# Patient Record
Sex: MALE | Race: WHITE | URBAN - METROPOLITAN AREA
[De-identification: names, ages, dates, MRNs, and addresses within clinical notes are randomized per-mention and may not be internally consistent; named-entity substitution may affect disease eponyms.]

---

## 2020-12-03 ENCOUNTER — OFFICE VISIT (OUTPATIENT)
Dept: PRIMARY CARE CLINIC | Age: 25
End: 2020-12-03
Payer: MEDICAID

## 2020-12-03 PROCEDURE — G8428 CUR MEDS NOT DOCUMENT: HCPCS | Performed by: NURSE PRACTITIONER

## 2020-12-03 PROCEDURE — G8421 BMI NOT CALCULATED: HCPCS | Performed by: NURSE PRACTITIONER

## 2020-12-03 PROCEDURE — 99211 OFF/OP EST MAY X REQ PHY/QHP: CPT | Performed by: NURSE PRACTITIONER

## 2020-12-03 NOTE — PROGRESS NOTES
Ron Tian received a viral test for COVID-19. They were educated on isolation and quarantine as appropriate. For any symptoms, they were directed to seek care from their PCP, given contact information to establish with a doctor, directed to an urgent care or the emergency room.

## 2020-12-07 LAB — SARS-COV-2, NAA: NOT DETECTED

## 2020-12-09 ENCOUNTER — VIRTUAL VISIT (OUTPATIENT)
Dept: PRIMARY CARE CLINIC | Age: 25
End: 2020-12-09
Payer: MEDICAID

## 2020-12-09 PROBLEM — F33.42 RECURRENT MAJOR DEPRESSIVE DISORDER, IN FULL REMISSION (HCC): Status: ACTIVE | Noted: 2017-08-30

## 2020-12-09 PROCEDURE — G8484 FLU IMMUNIZE NO ADMIN: HCPCS | Performed by: FAMILY MEDICINE

## 2020-12-09 PROCEDURE — G8427 DOCREV CUR MEDS BY ELIG CLIN: HCPCS | Performed by: FAMILY MEDICINE

## 2020-12-09 PROCEDURE — 99203 OFFICE O/P NEW LOW 30 MIN: CPT | Performed by: FAMILY MEDICINE

## 2020-12-09 PROCEDURE — 1036F TOBACCO NON-USER: CPT | Performed by: FAMILY MEDICINE

## 2020-12-09 PROCEDURE — G8421 BMI NOT CALCULATED: HCPCS | Performed by: FAMILY MEDICINE

## 2020-12-09 RX ORDER — HYDROXYZINE PAMOATE 50 MG/1
50 CAPSULE ORAL 3 TIMES DAILY PRN
Qty: 14 CAPSULE | Refills: 1 | Status: SHIPPED | OUTPATIENT
Start: 2020-12-09

## 2020-12-09 RX ORDER — ESCITALOPRAM OXALATE 20 MG/1
TABLET ORAL
COMMUNITY
Start: 2020-11-24 | End: 2021-04-05 | Stop reason: SDUPTHER

## 2020-12-09 RX ORDER — BUPROPION HYDROCHLORIDE 300 MG/1
TABLET ORAL
COMMUNITY
Start: 2020-11-24 | End: 2021-04-05 | Stop reason: SDUPTHER

## 2020-12-09 SDOH — HEALTH STABILITY: MENTAL HEALTH: HOW OFTEN DO YOU HAVE A DRINK CONTAINING ALCOHOL?: 2-4 TIMES A MONTH

## 2020-12-09 ASSESSMENT — ENCOUNTER SYMPTOMS
WHEEZING: 0
DIARRHEA: 0
COUGH: 0
CHEST TIGHTNESS: 0
EYE REDNESS: 0
SHORTNESS OF BREATH: 0
ABDOMINAL PAIN: 0
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
## 2020-12-30 ENCOUNTER — OFFICE VISIT (OUTPATIENT)
Dept: PRIMARY CARE CLINIC | Age: 25
End: 2020-12-30
Payer: MEDICAID

## 2020-12-30 ENCOUNTER — TELEPHONE (OUTPATIENT)
Dept: PRIMARY CARE CLINIC | Age: 25
End: 2020-12-30

## 2020-12-30 PROCEDURE — 99211 OFF/OP EST MAY X REQ PHY/QHP: CPT | Performed by: NURSE PRACTITIONER

## 2020-12-30 PROCEDURE — G8421 BMI NOT CALCULATED: HCPCS | Performed by: NURSE PRACTITIONER

## 2020-12-30 PROCEDURE — G8428 CUR MEDS NOT DOCUMENT: HCPCS | Performed by: NURSE PRACTITIONER

## 2020-12-31 LAB — SARS-COV-2, NAA: NOT DETECTED

## 2021-01-19 DIAGNOSIS — F33.42 RECURRENT MAJOR DEPRESSIVE DISORDER, IN FULL REMISSION (HCC): Primary | ICD-10-CM

## 2021-01-19 DIAGNOSIS — F41.9 ANXIETY: ICD-10-CM

## 2021-02-03 ENCOUNTER — TELEPHONE (OUTPATIENT)
Dept: PRIMARY CARE CLINIC | Age: 26
End: 2021-02-03

## 2021-02-03 NOTE — TELEPHONE ENCOUNTER
----- Message from Leighton Haja sent at 2/3/2021  8:15 AM EST -----  Subject: Referral Request    QUESTIONS   Reason for referral request? patient called and needs a new ref to see a   psychologist   due to Dr Cornelio Jara would not see him for an appt. please call   patient with a new psychologist ref name. Has the physician seen you for this condition before? No   Preferred Specialist (if applicable)? Do you already have an appointment scheduled? No  Additional Information for Provider?   ---------------------------------------------------------------------------  --------------  CALL BACK INFO  What is the best way for the office to contact you? OK to leave message on   voicemail  Preferred Call Back Phone Number?  [phone]

## 2021-02-04 NOTE — TELEPHONE ENCOUNTER
95 Shaun Quezada,  I'm confused why pt stated I wouldn't see him and he needs another referral? But noticed he is now scheduled? Just want to make sure I am clear about what pt is needing.    Thank you,  CW

## 2021-02-10 ENCOUNTER — OFFICE VISIT (OUTPATIENT)
Dept: PSYCHOLOGY | Age: 26
End: 2021-02-10
Payer: MEDICAID

## 2021-02-10 DIAGNOSIS — F41.0 PANIC DISORDER: ICD-10-CM

## 2021-02-10 DIAGNOSIS — F41.1 GAD (GENERALIZED ANXIETY DISORDER): Primary | ICD-10-CM

## 2021-02-10 DIAGNOSIS — F32.A DEPRESSION, UNSPECIFIED DEPRESSION TYPE: ICD-10-CM

## 2021-02-10 PROCEDURE — 90791 PSYCH DIAGNOSTIC EVALUATION: CPT | Performed by: PSYCHOLOGIST

## 2021-02-10 PROCEDURE — 1036F TOBACCO NON-USER: CPT | Performed by: PSYCHOLOGIST

## 2021-02-10 NOTE — Clinical Note
Buddy Fry closed office so pt couldn't schedule f/u with me. He needs appt in 1 month. Can office call him please?    Thank you,  CW

## 2021-02-10 NOTE — PATIENT INSTRUCTIONS
1. Consider [facility] for individual psychotherapy:     -in person  -male or female      [facility]  [address]  [address] [facility],  [facility]    [phone]      2. Consider [facility] as [facility] up:     [address] [facility] , [facility], [place], [address]  [phone]    3.  Return to clinic for Dr Jae Strange in 1 month, have office call to schedule

## 2021-02-10 NOTE — PROGRESS NOTES
Behavioral Health Consultation  Loli Galvin PsyD  Psychologist  2/10/2021  4:21 PM      Time spent with Patient: 40 minutes  This is patient's first  [facility] appointment. Reason for Consult:  Anxiety, depression   Referring Provider: Robert Manzano MD  [address]  [address],  [address]    Pt provided informed consent for the behavioral health program. Discussed with patient model of service to include the limits of confidentiality (i.e. abuse reporting, suicide intervention, etc.) and short-term intervention focused approach. Pt indicated understanding. Feedback given to PCP. S:    Presenting Problem (PP): anxiety, depression     Problem hx: initially needed new PCP \"down here\", 1 month after visit with her had panic attack    Divorce: finality of parents getting divorce at of December 2020,  for 1 year before this. Met my mother's new boyfriend at that time. 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    Past psych tx: no psychotherapy     Current psych med tx: bupropion 300 mg, escitalopram 20 mg, hydroxyzine 50 mg 3 x per day    Functional assessment/Typical day (how PP is affecting or being affected by. Vernon Villafuerte ):  Close relationships:  Fiance for the last 6 years, bought house together, live here in [place] from [facility]     Work: [employer] 2 years     Psych ROS:      Depression: reported feeling more depressed which is why he reached out to PCP, prior to this reported it was more on anxiety side      Radha: DENIES insomnia with increased energy, rapid speech, easily distracted or decreased attention, irritability, racing thoughts, expansive mood, increase in energy and goal directed behavior, grandiosity, flight of ideas     Anxiety:   see above     OCD:  denies     Panic:  SOB, pressure in chest, confused and dazed, usually lasts about 45-60 minutes, trigger: work and family, argument with fiance, only 3 panic attacks \"really bad\", 1st senior year when parents announced divorce. Finishing up college, job finding process, 3rd panic more work related      Psychosis: denies A/VH, or delusions    Substance abuse: none      PTSD:  negative screen     O:  MSE:    Appearance    alert, cooperative  Appetite normal  Sleep disturbance Yes  Fatigue Yes  Loss of pleasure Yes  Impulsive behavior No  Speech    normal rate, normal volume and well articulated  Mood    Anxious  Depressed  Panic attacks  Affect    normal affect  Thought Content    intact  Thought Process    linear, goal directed and coherent  Associations    logical connections  Insight    Good  Judgment    Intact  Orientation    oriented to person, place, time, and general circumstances  Memory    recent and remote memory intact  Attention/Concentration    intact  Morbid ideation No  Suicide Assessment    no suicidal ideation    History:    Medications:   Current Outpatient Medications   Medication Sig Dispense Refill    escitalopram (LEXAPRO) 20 MG tablet       buPROPion (WELLBUTRIN XL) 300 MG extended release tablet       hydrOXYzine (VISTARIL) 50 MG capsule Take 1 capsule by mouth 3 times daily as needed for Anxiety 14 capsule 1     No current facility-administered medications for this visit.         Social History:   Social History     Socioeconomic History    Marital status: Single     Spouse name: Not on file    Number of children: Not on file    Years of education: Not on file    Highest education level: Not on file   Occupational History    Occupation:       Employer: [employer]   Social Needs    Financial resource strain: Not on file    Food insecurity     Worry: Not on file     Inability: Not on file    Transportation needs     Medical: Not on file     Non-medical: Not on file   Tobacco Use    Smoking status: Never Smoker    Smokeless tobacco: Never Used   Substance and Sexual Activity    Alcohol use: Yes     Frequency: 2-4 times a month    Drug use: Never    Sexual activity: Yes     Partners: Female   Lifestyle    Physical activity     Days per week: Not on file     Minutes per session: Not on file    Stress: Not on file   Relationships    Social connections     Talks on phone: Not on file     Gets together: Not on file     Attends Advent service: Not on file     Active member of club or organization: Not on file     Attends meetings of clubs or organizations: Not on file     Relationship status: Not on file    Intimate partner violence     Fear of current or ex partner: Not on file     Emotionally abused: Not on file     Physically abused: Not on file     Forced sexual activity: Not on file   Other Topics Concern    Not on file   Social History Narrative    Not on file       TOBACCO:   reports that he has never smoked. He has never used smokeless tobacco.  ETOH:   reports current alcohol use. Family History:   Family History   Problem Relation Age of Onset    No Known Problems Mother     Seizures Father     Other Sister         Ilda         A:    ROHITH and panic disorder with co-morbid depression exacerbated by work pressures. New job last 2 years as . No psychotherapy. So much of our consult focused on gathering history and providing psycho-ed about various therapy treatment options: Due to chronic nature discussed need for referral for \"full dose\" psychotherapy which he was in full agreement with. Provided 2 referrals. I will continue to bridge services until longer term psychotherapist in place. F/u with me in 1 month.      Diagnosis:    ROHITH, Panic disorder; depressive disorder unspecified       Diagnosis Date    History of fracture of forearm      Problems with primary support group, Occupational problems and Other psychosocial and environmental problems    Safety: denied any si/hi risk, intent, or plan     Plan:  Pt interventions:    Practiced assertive communication, Trained in strategies for increasing balanced thinking, Discussed self-care (sleep, nutrition, rewarding activities, social support, exercise), Discussed benefits of referral for specialty care, Motivational Interviewing to increase patient confidence and compliance with adhering to behavioral change plan, Established rapport, Conducted functional assessment and Supportive techniques    Pt Behavioral Change Plan:    1. Consider [facility] for individual psychotherapy:   Request:   -in person  -male or female    [address] [place] [address], [address] [place] [address]    [phone]    2. Consider [facility] up for individual psychotherapy:     [address] [facility] , [facility], [place], [address]  [phone]    3.  Return to clinic for Dr Elias Wise in 1 month, have office call to schedule

## 2021-02-10 NOTE — TELEPHONE ENCOUNTER
Pt was told he could not schedule with you because he did not have a referral. I was able to get him scheduled.  If they have a [facility] PCP they shouldn't need a referral.

## 2021-03-17 ENCOUNTER — OFFICE VISIT (OUTPATIENT)
Dept: PSYCHOLOGY | Age: 26
End: 2021-03-17
Payer: MEDICAID

## 2021-03-17 DIAGNOSIS — F41.0 PANIC DISORDER: ICD-10-CM

## 2021-03-17 DIAGNOSIS — F41.1 GAD (GENERALIZED ANXIETY DISORDER): Primary | ICD-10-CM

## 2021-03-17 PROCEDURE — 90832 PSYTX W PT 30 MINUTES: CPT | Performed by: PSYCHOLOGIST

## 2021-03-17 PROCEDURE — 1036F TOBACCO NON-USER: CPT | Performed by: PSYCHOLOGIST

## 2021-03-17 NOTE — PROGRESS NOTES
Behavioral Health Consultation Follow-up  Alicia Perez PsyD  Psychologist  3/17/2021  2:36 PM      Time spent with Patient: 30 minutes  This is patient's second  [address]  appointment. Reason for Consult:  ROHITH, depression   Referring Provider: Misti Szymanski MD  [address]  [address],  [address]    Feedback given to PCP. S:    Last appt: 2/10. Stress levels \"a little bit better\". Contacted both places but no reply. Will call again. Focused on boundaries at work: 60 hours. Discussed assertive communication with boss about need for weekly treatment and ways he could slow down at work any amount. Ice climbing in [place] with friends which was fun and feels this helped to reduce his stress levels. Discussed how he could continue scheduling mini-breaks to manage anxiety. Work: 60 hours per week 7am 5 pm or 7 am to 7 pm        O:  MSE:    Appearance    alert, cooperative  Appetite normal  Sleep disturbance better  Fatigue better  Loss of pleasure better  Impulsive behavior No  Speech    normal rate, normal volume and well articulated  Mood    Stress levels down   Affect    normal affect  Thought Content    intact  Thought Process    linear, goal directed and coherent  Associations    logical connections  Insight    Good  Judgment    Intact  Orientation    oriented to person, place, time, and general circumstances  Memory    recent and remote memory intact  Attention/Concentration    intact  Morbid ideation No  Suicide Assessment    no suicidal ideation      History:    Medications:   Current Outpatient Medications   Medication Sig Dispense Refill    escitalopram (LEXAPRO) 20 MG tablet       buPROPion (WELLBUTRIN XL) 300 MG extended release tablet       hydrOXYzine (VISTARIL) 50 MG capsule Take 1 capsule by mouth 3 times daily as needed for Anxiety 14 capsule 1     No current facility-administered medications for this visit.         Social History:   Social History     Socioeconomic History    Marital status: Single     Spouse name: Not on file    Number of children: Not on file    Years of education: Not on file    Highest education level: Not on file   Occupational History    Occupation:       Employer: [employer]   Social Needs    Financial resource strain: Not on file    Food insecurity     Worry: Not on file     Inability: Not on file    Transportation needs     Medical: Not on file     Non-medical: Not on file   Tobacco Use    Smoking status: Never Smoker    Smokeless tobacco: Never Used   Substance and Sexual Activity    Alcohol use: Yes     Frequency: 2-4 times a month    Drug use: Never    Sexual activity: Yes     Partners: Female   Lifestyle    Physical activity     Days per week: Not on file     Minutes per session: Not on file    Stress: Not on file   Relationships    Social connections     Talks on phone: Not on file     Gets together: Not on file     Attends Roman Catholic service: Not on file     Active member of club or organization: Not on file     Attends meetings of clubs or organizations: Not on file     Relationship status: Not on file    Intimate partner violence     Fear of current or ex partner: Not on file     Emotionally abused: Not on file     Physically abused: Not on file     Forced sexual activity: Not on file   Other Topics Concern    Not on file   Social History Narrative    Not on file       TOBACCO:   reports that he has never smoked. He has never used smokeless tobacco.  ETOH:   reports current alcohol use. Family History:   Family History   Problem Relation Age of Onset    No Known Problems Mother     Seizures Father     Other Sister         Ilda       A:    See S: above. No longer term psychotherapist yet so I will continue to bridge services. Understands my [facility] [email or web address] [facility] role. Agreed to contact referrals again. If has trouble will reach back out to me and I will find other referrals for him.  Rest of appt focused on stress management and assertive communication at work. Excessive guilt seems to get in the way of him asking for his health needs. Agreed this will be essential focus on treatment once the psychotherapy gets started. Safety: denied any si/hi risk, intent, or plan    Diagnosis:    ROHITH, Panic disorder; depression       Diagnosis Date    History of fracture of forearm      Problems with primary support group and Occupational problems      Plan:  Pt interventions:    Practiced assertive communication, Trained in strategies for increasing balanced thinking, Discussed self-care (sleep, nutrition, rewarding activities, social support, exercise), Discussed benefits of referral for specialty care, Motivational Interviewing to determine importance and readiness for change, Discussed potential barriers to change and Supportive techniques. CBT interventions focused on excessive guilt management. Pt Behavioral Change Plan:    1. Will call Caryn Mcmillan for individual psychotherapy again:    Request:   -in person  -male or female     [address]  [address] [place] [address]     [phone]     2. [facility] for individual psychotherapy:      [facility] , [facility], [place], [address] [place] [address] [address]  [phone]     3. Continue to take Lexapro and wellbutrin as prescribed, go to Dr Amber Ghosh as needed  4.  Return to clinic for Dr Veronica Ruiz in 1 month, will continue to support and monitor until therapist is in place

## 2021-03-29 NOTE — PATIENT INSTRUCTIONS
1. Will call Kenneth Abrams for individual psychotherapy again:    Request:   -in person  -male or female     [address]  [address] [facility] [place]     [phone]     2. [facility] for individual psychotherapy:      [address] [facility] , [place] [address], [place], [address]  [phone]     3. Continue to take Lexapro and wellbutrin as prescribed, go to Dr Shree Reeves as needed  4.  Return to clinic for Dr Al Puckett in 1 month, will continue to support and monitor until therapist is in place

## 2021-04-05 ENCOUNTER — VIRTUAL VISIT (OUTPATIENT)
Dept: PRIMARY CARE CLINIC | Age: 26
End: 2021-04-05

## 2021-04-05 DIAGNOSIS — F33.42 RECURRENT MAJOR DEPRESSIVE DISORDER, IN FULL REMISSION (HCC): Primary | ICD-10-CM

## 2021-04-05 DIAGNOSIS — F41.9 ANXIETY: ICD-10-CM

## 2021-04-05 PROCEDURE — 1036F TOBACCO NON-USER: CPT | Performed by: FAMILY MEDICINE

## 2021-04-05 PROCEDURE — G8421 BMI NOT CALCULATED: HCPCS | Performed by: FAMILY MEDICINE

## 2021-04-05 PROCEDURE — 99441 PR PHYS/QHP TELEPHONE EVALUATION 5-10 MIN: CPT | Performed by: FAMILY MEDICINE

## 2021-04-05 PROCEDURE — G8427 DOCREV CUR MEDS BY ELIG CLIN: HCPCS | Performed by: FAMILY MEDICINE

## 2021-04-05 RX ORDER — BUPROPION HYDROCHLORIDE 300 MG/1
300 TABLET ORAL EVERY MORNING
Qty: 90 TABLET | Refills: 3 | Status: SHIPPED | OUTPATIENT
Start: 2021-04-05 | End: 2022-05-23

## 2021-04-05 RX ORDER — ESCITALOPRAM OXALATE 20 MG/1
20 TABLET ORAL DAILY
Qty: 90 TABLET | Refills: 3 | Status: SHIPPED | OUTPATIENT
Start: 2021-04-05 | End: 2021-11-02

## 2021-04-05 NOTE — PROGRESS NOTES
Nisha Parker is a 22 y.o. male evaluated via telephone on 4/5/2021. Consent:  He and/or health care decision maker is aware that that he may receive a bill for this telephone service, depending on his insurance coverage, and has provided verbal consent to proceed: Yes    Documentation:  I communicated with the patient and/or health care decision maker about refills of his meds for anxiety and depression. He is taking lexapro 20 mg daily and wellbutrin  mg daily. This regimen is working well for him. Details of this discussion including any medical advice provided:     1. Recurrent major depressive disorder, in full remission ([facility] [address]  Will refill and continue regimen. He is stable and controlled on this. No therapy. Will follow up in 3 months. - escitalopram (LEXAPRO) 20 MG tablet; Take 1 tablet by mouth daily  Dispense: 90 tablet; Refill: 3  - buPROPion (WELLBUTRIN XL) 300 MG extended release tablet; Take 1 tablet by mouth every morning  Dispense: 90 tablet; Refill: 3    2. Anxiety  As above. - escitalopram (LEXAPRO) 20 MG tablet; Take 1 tablet by mouth daily  Dispense: 90 tablet; Refill: 3  - buPROPion (WELLBUTRIN XL) 300 MG extended release tablet; Take 1 tablet by mouth every morning  Dispense: 90 tablet; Refill: 3      I affirm this is a Patient Initiated Episode with a Patient who has not had a related appointment within my department in the past 7 days or scheduled within the next 24 hours. Patient identification was verified at the start of the visit: Yes    Total Time: minutes: 5-10 minutes    The visit was conducted pursuant to the emergency declaration under the [facility] authority and the [employer] Act. Patient identification was verified, and a caregiver was present when appropriate. The patient was located in a state where the provider was credentialed to provide care. Note: not billable if this call serves to triage the patient into an appointment for the relevant concern      Erna Lockwood

## 2021-04-28 ENCOUNTER — OFFICE VISIT (OUTPATIENT)
Dept: PSYCHOLOGY | Age: 26
End: 2021-04-28
Payer: MEDICAID

## 2021-04-28 DIAGNOSIS — F41.1 GAD (GENERALIZED ANXIETY DISORDER): Primary | ICD-10-CM

## 2021-04-28 DIAGNOSIS — F32.A DEPRESSION, UNSPECIFIED DEPRESSION TYPE: ICD-10-CM

## 2021-04-28 DIAGNOSIS — F41.0 PANIC DISORDER: ICD-10-CM

## 2021-04-28 PROCEDURE — 1036F TOBACCO NON-USER: CPT | Performed by: PSYCHOLOGIST

## 2021-04-28 PROCEDURE — 90832 PSYTX W PT 30 MINUTES: CPT | Performed by: PSYCHOLOGIST

## 2021-04-28 NOTE — PATIENT INSTRUCTIONS
1. Continue to take Lexapro and Wellbutrin as prescribed  2. Call [facility] back to schedule new patient appointment for individual psychotherapy  3. Consider Shelby [facility] [place] [facility] [facility] at [facility] for individual psychotherapy:    [email or web address]    4.  Return to clinic for Dr Bubba Veliz in 2 months

## 2021-04-28 NOTE — PROGRESS NOTES
current facility-administered medications for this visit. Social History:   Social History     Socioeconomic History    Marital status: Single     Spouse name: Not on file    Number of children: Not on file    Years of education: Not on file    Highest education level: Not on file   Occupational History    Occupation:       Employer: [employer]   Social Needs    Financial resource strain: Not on file    Food insecurity     Worry: Not on file     Inability: Not on file    Transportation needs     Medical: Not on file     Non-medical: Not on file   Tobacco Use    Smoking status: Never Smoker    Smokeless tobacco: Never Used   Substance and Sexual Activity    Alcohol use: Yes     Frequency: 2-4 times a month    Drug use: Never    Sexual activity: Yes     Partners: Female   Lifestyle    Physical activity     Days per week: Not on file     Minutes per session: Not on file    Stress: Not on file   Relationships    Social connections     Talks on phone: Not on file     Gets together: Not on file     Attends Judaism service: Not on file     Active member of club or organization: Not on file     Attends meetings of clubs or organizations: Not on file     Relationship status: Not on file    Intimate partner violence     Fear of current or ex partner: Not on file     Emotionally abused: Not on file     Physically abused: Not on file     Forced sexual activity: Not on file   Other Topics Concern    Not on file   Social History Narrative    Not on file       TOBACCO:   reports that he has never smoked. He has never used smokeless tobacco.  ETOH:   reports current alcohol use.     Family History:   Family History   Problem Relation Age of Onset    No Known Problems Mother     Seizures Father     Other Sister         [facility]         A:  See S: above    Safety: denied any si/hi risk    Diagnosis:    ROHITH, panic disorder, depression       Diagnosis Date    History of fracture of forearm      Problems with primary support group and Problems related to the social environment    Plan:  Pt interventions:    Practiced assertive communication, Trained in strategies for increasing balanced thinking, Discussed self-care (sleep, nutrition, rewarding activities, social support, exercise), Discussed benefits of referral for specialty care and Motivational Interviewing to increase patient confidence and compliance with adhering to behavioral change plan    Pt Behavioral Change Plan:    1. Continue to take Lexapro and Wellbutrin as prescribed  2. Call [facility] counseling back to schedule new patient appointment for individual psychotherapy  3. Consider Lien Zhang, [facility] at [address] [facility] for individual psychotherapy:    [email or web address]    4. Return to clinic for Dr Joseph Nair in 2 months     **Left message with pt that Lien Zhang has room for him as new pt. Asked that he call her asap.

## 2021-11-01 NOTE — PROGRESS NOTES
Chief Complaint   Patient presents with    Annual Exam         ASSESSMENT/PLAN:  1. Annual physical exam  VS reviewed     BMI reviewed   All questions answered. F/u discussed. Appropriate healthy lifestyle modifications discussed. 2. ROHITH (generalized anxiety disorder)  Will stop Lexapro and start Zoloft  Follow-up in 4 weeks to gauge improvement in anxiety  Continue Wellbutrin  mg daily  Offered behavioral health politely declined  If this switch to Zoloft does not seem to help or make his anxiety worse, will discuss ADHD eval and go back to original Lexapro  Updated ROHITH and interpreted as moderate anxiety  Continue hydroxyzine 50 mg 3 times a day as needed for anxiety attack  His only need to use this once or twice  - sertraline (ZOLOFT) 25 MG tablet; Take 1 tablet by mouth daily  Dispense: 90 tablet; Refill: 1    3. Recurrent major depressive disorder, in full remission ([facility] [address]  We will stop Lexapro and start Zoloft  Follow-up in 4 weeks   Continue Wellbutrin  mg daily  Offered behavioral health politely declined  If this switch to Zoloft does not seem to help or make his anxiety worse, will discuss ADHD eval and go back to original Lexapro  - sertraline (ZOLOFT) 25 MG tablet; Take 1 tablet by mouth daily  Dispense: 90 tablet; Refill: 1    4. Elevated BP without diagnosis of hypertension  Elevated in triage x2  Follow-up in 4 weeks after implementing lifestyle changes and dietary modifications    I have spent 30 minutes of face to face time with the patient with more than 50%  spent counseling , educating and coordinating care for mental health and BP.       HPI:  Chrystal Colmenares is a 22 y.o. (: 1995) here today for annual physical.       Starts at 6 am and leaves around 4 or 5 pm.  Can manage to sit and focus for about 10 minutes and then reaches for his phone. Focus issues have been bothering him forever but now becoming an issue for the last year regarding efficiency at work. He is also plagued by anxiety:  ROHITH 7 SCORE 11/2/2021   ROHITH-7 Total Score 10   Interpretation of ROHITH-7 score: 5-9 = mild anxiety, 10-14 = moderate anxiety, 15+ = severe anxiety. Recommend referral to behavioral health for scores 10 or greater. Currently on Lexapro 20 mg daily and Wellbutrin  mg daily. Wellbutrin was started to treat the decreased libido side effect. He has been on both of these medications for over 8 years. No therapy at this time. Offered behavioral health but politely declined. After discussing his ROHITH score, he believes that his anxiety is not treated as well as it could be which is causing his lack of focus. He is open to switching up his regimen and if this does not work, figuring out if he has ADHD. Review of Systems   Constitutional: Negative for appetite change, chills, diaphoresis, fatigue, fever and unexpected weight change. HENT: Negative for congestion, postnasal drip, rhinorrhea, sinus pressure, sneezing and sore throat. Eyes: Negative for redness, itching and visual disturbance. Respiratory: Negative for cough, chest tightness, shortness of breath and wheezing. Cardiovascular: Negative for chest pain, palpitations and leg swelling. Gastrointestinal: Negative for abdominal pain, blood in stool, constipation, diarrhea, nausea and vomiting. Endocrine: Negative for cold intolerance, heat intolerance, polydipsia and polyuria. Genitourinary: Negative for decreased urine volume and dysuria. Musculoskeletal: Negative for arthralgias, back pain, joint swelling, myalgias and neck pain. Skin: Negative for rash and wound. Allergic/Immunologic: Negative for environmental allergies. Neurological: Negative for dizziness, tremors, syncope, weakness, light-headedness, numbness and headaches. Hematological: Negative for adenopathy. Does not bruise/bleed easily. Psychiatric/Behavioral: Positive for behavioral problems.  Negative for agitation, confusion, decreased concentration, self-injury, sleep disturbance and suicidal ideas. The patient is nervous/anxious. Past Medical History:   Diagnosis Date    History of fracture of forearm        Family History   Problem Relation Age of Onset    No Known Problems Mother     Seizures Father     Other Sister         [place]       Social History     Tobacco Use    Smoking status: Never Smoker    Smokeless tobacco: Never Used   Vaping Use    Vaping Use: Never used   Substance Use Topics    Alcohol use: Yes    Drug use: Never       New Prescriptions    SERTRALINE (ZOLOFT) 25 MG TABLET    Take 1 tablet by mouth daily       Meds Prior to visit:  Current Outpatient Medications on File Prior to Visit   Medication Sig Dispense Refill    buPROPion (WELLBUTRIN XL) 300 MG extended release tablet Take 1 tablet by mouth every morning 90 tablet 3    hydrOXYzine (VISTARIL) 50 MG capsule Take 1 capsule by mouth 3 times daily as needed for Anxiety 14 capsule 1     No current facility-administered medications on file prior to visit. No Known Allergies    OBJECTIVE:  BP (!) 157/81   Pulse 73   Temp 97.3 °F (36.3 °C) (Temporal)   Resp 16   Ht 5' 9\" (1.753 m)   Wt 190 lb (86.2 kg)   BMI 28.06 kg/m²   BP Readings from Last 2 Encounters:   11/02/21 (!) 157/81     Wt Readings from Last 3 Encounters:   11/02/21 190 lb (86.2 kg)       Physical Exam  Vitals reviewed. Constitutional:       General: He is not in acute distress. Appearance: Normal appearance. He is well-developed. He is not ill-appearing. HENT:      Head: Normocephalic and atraumatic. Right Ear: Tympanic membrane, ear canal and external ear normal. There is no impacted cerumen. Left Ear: Ear canal and external ear normal. There is no impacted cerumen. Nose: Nose normal. No congestion or rhinorrhea. Mouth/Throat:      Mouth: Mucous membranes are moist.      Pharynx: Oropharynx is clear.  No oropharyngeal exudate or posterior oropharyngeal erythema. Eyes:      General: No scleral icterus. Right eye: No discharge. Left eye: No discharge. Extraocular Movements: Extraocular movements intact. Conjunctiva/sclera: Conjunctivae normal.      Pupils: Pupils are equal, round, and reactive to light. Cardiovascular:      Rate and Rhythm: Normal rate and regular rhythm. Pulses: Normal pulses. Heart sounds: Normal heart sounds. No murmur heard. Comments: Normal radial and pedal pulses  Pulmonary:      Effort: Pulmonary effort is normal. No respiratory distress. Breath sounds: Normal breath sounds. No wheezing. Chest:      Chest wall: No tenderness. Abdominal:      General: Bowel sounds are normal. There is no distension. Palpations: Abdomen is soft. There is no mass. Tenderness: There is no abdominal tenderness. Comments: Normal liver and spleen, no organomegaly. Musculoskeletal:         General: No tenderness or deformity. Normal range of motion. Cervical back: Normal range of motion and neck supple. Right lower leg: No edema. Left lower leg: No edema. Comments: Range of motion intact in all extremities   Lymphadenopathy:      Cervical: No cervical adenopathy. Skin:     General: Skin is warm and dry. Capillary Refill: Capillary refill takes less than 2 seconds. Findings: No erythema or rash. Neurological:      Mental Status: He is alert and oriented to person, place, and time. Sensory: No sensory deficit. Motor: No abnormal muscle tone. Coordination: Coordination normal.   Psychiatric:         Behavior: Behavior normal.         Thought Content:  Thought content normal.         Judgment: Judgment normal.      Comments:           No results found for: WBC, HGB, HCT, MCV, PLT  No results found for: NA, K, CL, CO2, BUN, CREATININE, GLUCOSE, CALCIUM, PROT, LABALBU, BILITOT, ALKPHOS, AST, ALT, LABGLOM, GFRAA, AGRATIO, GLOB  No results found for: CHOL  No results found for: TRIG  No results found for: HDL  No results found for: LDLCHOLESTEROL, LDLCALC  No results found for: LABVLDL, VLDL  No results found for: LABA1C      Discussed use, benefit, and side effects of prescribed medications. Barriers to medication compliance addressed. All patient questions answered. Pt voiced understanding. RTC Return in about 4 weeks (around 11/30/2021), or if symptoms worsen or fail to improve.     Future Appointments   Date Time Provider Antonieta Mcdonough   12/2/2021  3:45 PM MD Evonne Forbes MD  11/2/2021  5:20 PM

## 2021-11-02 ENCOUNTER — OFFICE VISIT (OUTPATIENT)
Dept: PRIMARY CARE CLINIC | Age: 26
End: 2021-11-02
Payer: MEDICAID

## 2021-11-02 VITALS
TEMPERATURE: 97.3 F | HEART RATE: 73 BPM | DIASTOLIC BLOOD PRESSURE: 81 MMHG | HEIGHT: 69 IN | RESPIRATION RATE: 16 BRPM | SYSTOLIC BLOOD PRESSURE: 157 MMHG | BODY MASS INDEX: 28.14 KG/M2 | WEIGHT: 190 LBS

## 2021-11-02 DIAGNOSIS — F41.1 GAD (GENERALIZED ANXIETY DISORDER): ICD-10-CM

## 2021-11-02 DIAGNOSIS — R03.0 ELEVATED BP WITHOUT DIAGNOSIS OF HYPERTENSION: ICD-10-CM

## 2021-11-02 DIAGNOSIS — Z00.00 ANNUAL PHYSICAL EXAM: Primary | ICD-10-CM

## 2021-11-02 DIAGNOSIS — F33.42 RECURRENT MAJOR DEPRESSIVE DISORDER, IN FULL REMISSION (HCC): ICD-10-CM

## 2021-11-02 PROCEDURE — G8419 CALC BMI OUT NRM PARAM NOF/U: HCPCS | Performed by: FAMILY MEDICINE

## 2021-11-02 PROCEDURE — 99214 OFFICE O/P EST MOD 30 MIN: CPT | Performed by: FAMILY MEDICINE

## 2021-11-02 PROCEDURE — 96127 BRIEF EMOTIONAL/BEHAV ASSMT: CPT | Performed by: FAMILY MEDICINE

## 2021-11-02 PROCEDURE — 1036F TOBACCO NON-USER: CPT | Performed by: FAMILY MEDICINE

## 2021-11-02 PROCEDURE — G8484 FLU IMMUNIZE NO ADMIN: HCPCS | Performed by: FAMILY MEDICINE

## 2021-11-02 PROCEDURE — G8427 DOCREV CUR MEDS BY ELIG CLIN: HCPCS | Performed by: FAMILY MEDICINE

## 2021-11-02 PROCEDURE — 99395 PREV VISIT EST AGE 18-39: CPT | Performed by: FAMILY MEDICINE

## 2021-11-02 RX ORDER — SERTRALINE HYDROCHLORIDE 25 MG/1
25 TABLET, FILM COATED ORAL DAILY
Qty: 90 TABLET | Refills: 1 | Status: SHIPPED | OUTPATIENT
Start: 2021-11-02 | End: 2022-06-17

## 2021-11-02 ASSESSMENT — ENCOUNTER SYMPTOMS
EYE ITCHING: 0
WHEEZING: 0
ABDOMINAL PAIN: 0
SORE THROAT: 0
SHORTNESS OF BREATH: 0
RHINORRHEA: 0
NAUSEA: 0
BACK PAIN: 0
CHEST TIGHTNESS: 0
SINUS PRESSURE: 0
EYE REDNESS: 0
BLOOD IN STOOL: 0
DIARRHEA: 0
VOMITING: 0
COUGH: 0
CONSTIPATION: 0

## 2021-11-02 ASSESSMENT — ANXIETY QUESTIONNAIRES
4. TROUBLE RELAXING: 3
GAD7 TOTAL SCORE: 10
2. NOT BEING ABLE TO STOP OR CONTROL WORRYING: 1
IF YOU CHECKED OFF ANY PROBLEMS ON THIS QUESTIONNAIRE, HOW DIFFICULT HAVE THESE PROBLEMS MADE IT FOR YOU TO DO YOUR WORK, TAKE CARE OF THINGS AT HOME, OR GET ALONG WITH OTHER PEOPLE: SOMEWHAT DIFFICULT
7. FEELING AFRAID AS IF SOMETHING AWFUL MIGHT HAPPEN: 0
3. WORRYING TOO MUCH ABOUT DIFFERENT THINGS: 1
6. BECOMING EASILY ANNOYED OR IRRITABLE: 0
5. BEING SO RESTLESS THAT IT IS HARD TO SIT STILL: 3
1. FEELING NERVOUS, ANXIOUS, OR ON EDGE: 2

## 2021-11-02 NOTE — PATIENT INSTRUCTIONS
[address] shot number    Please make an appointment with Dr. Brain Vasquez, if you want to start therapy or counseling.

## 2021-12-01 NOTE — PROGRESS NOTES
Chief Complaint   Patient presents with    Depression         ASSESSMENT/PLAN:  1. ROHITH (generalized anxiety disorder)  Updated ROHITH and interpreted  Improved  Continue Zoloft and Wellbutrin  Follow-up in 6 to 8 weeks after starting new project at work and he has settled from the holidays and travel  - CT BEHAV ASSMT W/SCORE & DOCD/STAND INSTRUMENT    2. Recurrent major depressive disorder, in full remission ([address]  Improved now that he is on Zoloft and continue his Wellbutrin  Follow up in 6 to 8 weeks after starting new project at work and he has settled from the holidays and travel    Offered behavioral health but politely declined at this time. HPI:  Leticia Keita is a 32 y.o. (: 1995) here today for med discussion and SEs. Stopped Lexapro and started Zoloft while still taking Wellbutrin. Was having some SEs of lexapro WD like feeling foggy and telescoping vision but since then this has resolved. He feels better on zoloft. ROHITH 7 SCORE 2021   ROHITH-7 Total Score 4 10   Interpretation of ROHITH-7 score: 5-9 = mild anxiety, 10-14 = moderate anxiety, 15+ = severe anxiety. Recommend referral to behavioral health for scores 10 or greater. Sleeping ok  About the same as before  Appetite is good. Review of Systems   Constitutional: Negative for activity change, chills, fatigue and fever. HENT: Negative for congestion. Eyes: Negative for redness. Respiratory: Negative for cough, chest tightness, shortness of breath and wheezing. Cardiovascular: Negative for chest pain and palpitations. Gastrointestinal: Negative for abdominal pain and diarrhea. Genitourinary: Negative for difficulty urinating. Musculoskeletal: Negative for arthralgias. Skin: Negative for rash. Allergic/Immunologic: Negative for immunocompromised state. Neurological: Negative for dizziness, light-headedness and headaches. Hematological: Negative for adenopathy.    Psychiatric/Behavioral: Negative for behavioral problems. Past Medical History:   Diagnosis Date    History of fracture of forearm        Family History   Problem Relation Age of Onset    No Known Problems Mother     Seizures Father     Other Sister         [place]       Social History     Tobacco Use    Smoking status: Never Smoker    Smokeless tobacco: Never Used   Vaping Use    Vaping Use: Never used   Substance Use Topics    Alcohol use: Yes    Drug use: Never       New Prescriptions    No medications on file       Meds Prior to visit:  Current Outpatient Medications on File Prior to Visit   Medication Sig Dispense Refill    sertraline (ZOLOFT) 25 MG tablet Take 1 tablet by mouth daily 90 tablet 1    buPROPion (WELLBUTRIN XL) 300 MG extended release tablet Take 1 tablet by mouth every morning 90 tablet 3    hydrOXYzine (VISTARIL) 50 MG capsule Take 1 capsule by mouth 3 times daily as needed for Anxiety 14 capsule 1     No current facility-administered medications on file prior to visit. No Known Allergies    OBJECTIVE:  /80   Pulse 78   Temp 98.2 °F (36.8 °C) (Temporal)   Resp 16   Wt 189 lb 12.8 oz (86.1 kg)   BMI 28.03 kg/m²   BP Readings from Last 2 Encounters:   12/02/21 136/80   11/02/21 (!) 157/81     Wt Readings from Last 3 Encounters:   12/02/21 189 lb 12.8 oz (86.1 kg)   11/02/21 190 lb (86.2 kg)       Physical Exam  Vitals and nursing note reviewed. Constitutional:       General: He is not in acute distress. Appearance: Normal appearance. HENT:      Head: Normocephalic and atraumatic. Right Ear: External ear normal.      Left Ear: External ear normal.      Nose: Nose normal.      Mouth/Throat:      Mouth: Mucous membranes are moist.      Pharynx: Oropharynx is clear. Eyes:      Extraocular Movements: Extraocular movements intact. Conjunctiva/sclera: Conjunctivae normal.      Pupils: Pupils are equal, round, and reactive to light.    Cardiovascular:      Rate and Rhythm: Normal rate and regular rhythm. Pulses: Normal pulses. Heart sounds: Normal heart sounds. Pulmonary:      Effort: Pulmonary effort is normal. No respiratory distress. Breath sounds: Normal breath sounds. No wheezing. Abdominal:      General: Bowel sounds are normal.   Musculoskeletal:         General: No signs of injury. Normal range of motion. Cervical back: Normal range of motion. Skin:     General: Skin is warm. Capillary Refill: Capillary refill takes less than 2 seconds. Findings: No erythema. Neurological:      General: No focal deficit present. Mental Status: He is alert and oriented to person, place, and time. Mental status is at baseline. Psychiatric:         Mood and Affect: Mood normal.         Behavior: Behavior normal.         Thought Content: Thought content normal.         Judgment: Judgment normal.         Discussed use, benefit, and side effects of prescribed medications. Barriers to medication compliance addressed. All patient questions answered. Pt voiced understanding. RTC Return for 6-8 weeks (VV or OV).     Future Appointments   Date Time Provider Antonieta Mcdonough   1/20/2022  3:45 PM MD Madeline Phelps MD  12/2/2021  5:09 PM

## 2021-12-02 ENCOUNTER — OFFICE VISIT (OUTPATIENT)
Dept: PRIMARY CARE CLINIC | Age: 26
End: 2021-12-02
Payer: MEDICAID

## 2021-12-02 VITALS
WEIGHT: 189.8 LBS | HEART RATE: 78 BPM | TEMPERATURE: 98.2 F | BODY MASS INDEX: 28.03 KG/M2 | DIASTOLIC BLOOD PRESSURE: 80 MMHG | SYSTOLIC BLOOD PRESSURE: 136 MMHG | RESPIRATION RATE: 16 BRPM

## 2021-12-02 DIAGNOSIS — F33.42 RECURRENT MAJOR DEPRESSIVE DISORDER, IN FULL REMISSION (HCC): ICD-10-CM

## 2021-12-02 DIAGNOSIS — F41.1 GAD (GENERALIZED ANXIETY DISORDER): Primary | ICD-10-CM

## 2021-12-02 PROCEDURE — 1036F TOBACCO NON-USER: CPT | Performed by: FAMILY MEDICINE

## 2021-12-02 PROCEDURE — 99213 OFFICE O/P EST LOW 20 MIN: CPT | Performed by: FAMILY MEDICINE

## 2021-12-02 PROCEDURE — 96127 BRIEF EMOTIONAL/BEHAV ASSMT: CPT | Performed by: FAMILY MEDICINE

## 2021-12-02 PROCEDURE — G8484 FLU IMMUNIZE NO ADMIN: HCPCS | Performed by: FAMILY MEDICINE

## 2021-12-02 PROCEDURE — G8427 DOCREV CUR MEDS BY ELIG CLIN: HCPCS | Performed by: FAMILY MEDICINE

## 2021-12-02 PROCEDURE — G8419 CALC BMI OUT NRM PARAM NOF/U: HCPCS | Performed by: FAMILY MEDICINE

## 2021-12-02 ASSESSMENT — ANXIETY QUESTIONNAIRES
1. FEELING NERVOUS, ANXIOUS, OR ON EDGE: 1
6. BECOMING EASILY ANNOYED OR IRRITABLE: 1
3. WORRYING TOO MUCH ABOUT DIFFERENT THINGS: 0
GAD7 TOTAL SCORE: 4
4. TROUBLE RELAXING: 1
5. BEING SO RESTLESS THAT IT IS HARD TO SIT STILL: 1
2. NOT BEING ABLE TO STOP OR CONTROL WORRYING: 0
7. FEELING AFRAID AS IF SOMETHING AWFUL MIGHT HAPPEN: 0
IF YOU CHECKED OFF ANY PROBLEMS ON THIS QUESTIONNAIRE, HOW DIFFICULT HAVE THESE PROBLEMS MADE IT FOR YOU TO DO YOUR WORK, TAKE CARE OF THINGS AT HOME, OR GET ALONG WITH OTHER PEOPLE: SOMEWHAT DIFFICULT

## 2021-12-02 ASSESSMENT — ENCOUNTER SYMPTOMS
DIARRHEA: 0
CHEST TIGHTNESS: 0
ABDOMINAL PAIN: 0
WHEEZING: 0
COUGH: 0
SHORTNESS OF BREATH: 0
EYE REDNESS: 0

## 2022-05-21 DIAGNOSIS — F41.9 ANXIETY: ICD-10-CM

## 2022-05-21 DIAGNOSIS — F33.42 RECURRENT MAJOR DEPRESSIVE DISORDER, IN FULL REMISSION (HCC): ICD-10-CM

## 2022-05-23 RX ORDER — BUPROPION HYDROCHLORIDE 300 MG/1
TABLET ORAL
Qty: 30 TABLET | Refills: 2 | Status: SHIPPED | OUTPATIENT
Start: 2022-05-23 | End: 2022-09-13

## 2022-05-23 NOTE — TELEPHONE ENCOUNTER
Medication:   Requested Prescriptions     Pending Prescriptions Disp Refills    buPROPion (WELLBUTRIN XL) 300 MG extended release tablet [Pharmacy Med Name: buPROPion HCL  MG TABLET] 30 tablet      Sig: TAKE ONE TABLET BY MOUTH EVERY MORNING        Last Filled:      Patient Phone Number: [phone] (home)     Last appt: 12/2/2021   Next appt: Visit date not found    Last OARRS: No flowsheet data found.

## 2022-06-17 DIAGNOSIS — F41.1 GAD (GENERALIZED ANXIETY DISORDER): ICD-10-CM

## 2022-06-17 DIAGNOSIS — F33.42 RECURRENT MAJOR DEPRESSIVE DISORDER, IN FULL REMISSION (HCC): ICD-10-CM

## 2022-06-17 RX ORDER — SERTRALINE HYDROCHLORIDE 25 MG/1
TABLET, FILM COATED ORAL
Qty: 90 TABLET | Refills: 3 | Status: SHIPPED | OUTPATIENT
Start: 2022-06-17

## 2022-06-17 NOTE — TELEPHONE ENCOUNTER
Medication:   Requested Prescriptions     Pending Prescriptions Disp Refills    sertraline (ZOLOFT) 25 MG tablet [Pharmacy Med Name: SERTRALINE HCL 25 MG TABLET] 30 tablet      Sig: TAKE ONE TABLET BY MOUTH DAILY        Last Filled:11/2/2021       Patient Phone Number: [phone] (home)     Last appt: 12/2/2021   Next appt: Visit date not found    Last OARRS: No flowsheet data found.

## 2022-09-13 DIAGNOSIS — F33.42 RECURRENT MAJOR DEPRESSIVE DISORDER, IN FULL REMISSION (HCC): ICD-10-CM

## 2022-09-13 DIAGNOSIS — F41.9 ANXIETY: ICD-10-CM

## 2022-09-13 RX ORDER — BUPROPION HYDROCHLORIDE 300 MG/1
TABLET ORAL
Qty: 30 TABLET | Refills: 2 | Status: SHIPPED | OUTPATIENT
Start: 2022-09-13

## 2022-09-13 NOTE — TELEPHONE ENCOUNTER
Medication:   Requested Prescriptions     Pending Prescriptions Disp Refills    buPROPion (WELLBUTRIN XL) 300 MG extended release tablet [Pharmacy Med Name: buPROPion HCL  MG TABLET] 30 tablet 2     Sig: TAKE ONE TABLET BY MOUTH EVERY MORNING        Last Filled:  05/23/22    Patient Phone Number: [phone] (home)     Last appt: 12/2/2021 Return for 6-8 weeks (VV or OV). Next appt: Visit date not found    Last OARRS: No flowsheet data found.

## 2022-09-14 RX ORDER — BUPROPION HYDROCHLORIDE 300 MG/1
TABLET ORAL
Qty: 30 TABLET | Refills: 2 | OUTPATIENT
Start: 2022-09-14

## 2022-09-14 NOTE — TELEPHONE ENCOUNTER
Medication:   Requested Prescriptions     Pending Prescriptions Disp Refills    buPROPion (WELLBUTRIN XL) 300 MG extended release tablet 30 tablet 2        Last Filled:      Patient Phone Number: [phone] (home)     Last appt: 12/2/2021   Next appt: Visit date not found    Last OARRS: No flowsheet data found.

## 2022-11-07 ENCOUNTER — OFFICE VISIT (OUTPATIENT)
Dept: PRIMARY CARE CLINIC | Age: 27
End: 2022-11-07
Payer: MEDICAID

## 2022-11-07 VITALS
WEIGHT: 199.2 LBS | HEART RATE: 85 BPM | BODY MASS INDEX: 29.51 KG/M2 | DIASTOLIC BLOOD PRESSURE: 80 MMHG | SYSTOLIC BLOOD PRESSURE: 126 MMHG | HEIGHT: 69 IN

## 2022-11-07 DIAGNOSIS — Z83.2 FAMILY HISTORY OF BLOOD DISEASE: ICD-10-CM

## 2022-11-07 DIAGNOSIS — Z00.00 ENCOUNTER FOR WELL ADULT EXAM WITHOUT ABNORMAL FINDINGS: Primary | ICD-10-CM

## 2022-11-07 DIAGNOSIS — Z23 IMMUNIZATION DUE: ICD-10-CM

## 2022-11-07 PROCEDURE — G0008 ADMIN INFLUENZA VIRUS VAC: HCPCS | Performed by: FAMILY MEDICINE

## 2022-11-07 PROCEDURE — 90674 CCIIV4 VAC NO PRSV 0.5 ML IM: CPT | Performed by: FAMILY MEDICINE

## 2022-11-07 PROCEDURE — G8482 FLU IMMUNIZE ORDER/ADMIN: HCPCS | Performed by: FAMILY MEDICINE

## 2022-11-07 PROCEDURE — 99395 PREV VISIT EST AGE 18-39: CPT | Performed by: FAMILY MEDICINE

## 2022-11-07 ASSESSMENT — PATIENT HEALTH QUESTIONNAIRE - PHQ9
1. LITTLE INTEREST OR PLEASURE IN DOING THINGS: 1
8. MOVING OR SPEAKING SO SLOWLY THAT OTHER PEOPLE COULD HAVE NOTICED. OR THE OPPOSITE, BEING SO FIGETY OR RESTLESS THAT YOU HAVE BEEN MOVING AROUND A LOT MORE THAN USUAL: 0
3. TROUBLE FALLING OR STAYING ASLEEP: 1
SUM OF ALL RESPONSES TO PHQ QUESTIONS 1-9: 7
SUM OF ALL RESPONSES TO PHQ9 QUESTIONS 1 & 2: 1
7. TROUBLE CONCENTRATING ON THINGS, SUCH AS READING THE NEWSPAPER OR WATCHING TELEVISION: 1
9. THOUGHTS THAT YOU WOULD BE BETTER OFF DEAD, OR OF HURTING YOURSELF: 0
6. FEELING BAD ABOUT YOURSELF - OR THAT YOU ARE A FAILURE OR HAVE LET YOURSELF OR YOUR FAMILY DOWN: 1
4. FEELING TIRED OR HAVING LITTLE ENERGY: 1
2. FEELING DOWN, DEPRESSED OR HOPELESS: 0
5. POOR APPETITE OR OVEREATING: 2
SUM OF ALL RESPONSES TO PHQ QUESTIONS 1-9: 7

## 2022-11-07 ASSESSMENT — ENCOUNTER SYMPTOMS
SHORTNESS OF BREATH: 0
CHEST TIGHTNESS: 0
ABDOMINAL PAIN: 0
COUGH: 0
EYE REDNESS: 0
WHEEZING: 0
DIARRHEA: 0

## 2022-11-07 NOTE — PROGRESS NOTES
Chief Complaint   Patient presents with    Annual Exam         ASSESSMENT/PLAN:  1. Encounter for well adult exam without abnormal findings  VS reviewed     BMI reviewed   All questions answered. F/u discussed. Appropriate healthy lifestyle modifications discussed. - LIPID PANEL; Future  - Comprehensive Metabolic Panel; Future    2. Immunization due  administered  - Influenza, FLUCELVAX, (age 10 mo+), IM, PF, 0.5 mL    3. Fam hx of Hemophagocytic Lymphohistiocytosis  Referral to [facility] genetics for testing. HPI:  Jolie Camargo is a 32 y.o. (: 1995) here today for physical exam for insurance. Mentally, doing well. Rx: Zoloft and bupropion; no refills needed. No concerns. Needs blood work completed. Would like to update flu shot. Review of Systems   Constitutional:  Negative for activity change, chills, fatigue and fever. HENT:  Negative for congestion. Eyes:  Negative for redness. Respiratory:  Negative for cough, chest tightness, shortness of breath and wheezing. Cardiovascular:  Negative for chest pain and palpitations. Gastrointestinal:  Negative for abdominal pain and diarrhea. Genitourinary:  Negative for difficulty urinating. Musculoskeletal:  Negative for arthralgias. Skin:  Negative for rash. Allergic/Immunologic: Negative for immunocompromised state. Neurological:  Negative for dizziness, light-headedness and headaches. Hematological:  Negative for adenopathy. Psychiatric/Behavioral:  Negative for behavioral problems. The patient is nervous/anxious.       Past Medical History:   Diagnosis Date    History of fracture of forearm        Family History   Problem Relation Age of Onset    No Known Problems Mother     Seizures Father     Other Sister         [facility]       Social History     Tobacco Use    Smoking status: Never    Smokeless tobacco: Never   Vaping Use    Vaping Use: Never used   Substance Use Topics    Alcohol use: Yes    Drug use: Never New Prescriptions    No medications on file       Meds Prior to visit:  Current Outpatient Medications on File Prior to Visit   Medication Sig Dispense Refill    buPROPion (WELLBUTRIN XL) 300 MG extended release tablet TAKE ONE TABLET BY MOUTH EVERY MORNING 30 tablet 2    hydrOXYzine (VISTARIL) 50 MG capsule Take 1 capsule by mouth 3 times daily as needed for Anxiety 14 capsule 1    sertraline (ZOLOFT) 25 MG tablet TAKE ONE TABLET BY MOUTH DAILY 90 tablet 3     No current facility-administered medications on file prior to visit. No Known Allergies    OBJECTIVE:  /80   Pulse 85   Ht 5' 9\" (1.753 m)   Wt 199 lb 3.2 oz (90.4 kg)   BMI 29.42 kg/m²   BP Readings from Last 2 Encounters:   11/07/22 126/80   12/02/21 136/80     Wt Readings from Last 3 Encounters:   11/07/22 199 lb 3.2 oz (90.4 kg)   12/02/21 189 lb 12.8 oz (86.1 kg)   11/02/21 190 lb (86.2 kg)       Physical Exam  Vitals and nursing note reviewed. Constitutional:       General: He is not in acute distress. Appearance: Normal appearance. He is well-developed. He is not ill-appearing. HENT:      Head: Normocephalic and atraumatic. Right Ear: Tympanic membrane, ear canal and external ear normal. There is no impacted cerumen. Left Ear: Tympanic membrane, ear canal and external ear normal. There is no impacted cerumen. Nose: Nose normal. No congestion or rhinorrhea. Mouth/Throat:      Mouth: Mucous membranes are moist.      Pharynx: Oropharynx is clear. No oropharyngeal exudate or posterior oropharyngeal erythema. Eyes:      General: No scleral icterus. Right eye: No discharge. Left eye: No discharge. Extraocular Movements: Extraocular movements intact. Conjunctiva/sclera: Conjunctivae normal.      Pupils: Pupils are equal, round, and reactive to light. Cardiovascular:      Rate and Rhythm: Normal rate and regular rhythm. Pulses: Normal pulses.       Heart sounds: Normal heart sounds. No murmur heard. Comments: Normal radial and pedal pulses  Pulmonary:      Effort: Pulmonary effort is normal. No respiratory distress. Breath sounds: Normal breath sounds. No wheezing. Chest:      Chest wall: No tenderness. Abdominal:      General: Bowel sounds are normal. There is no distension. Palpations: Abdomen is soft. There is no mass. Tenderness: There is no abdominal tenderness. Comments: Normal liver and spleen, no organomegaly. Musculoskeletal:         General: No tenderness, deformity or signs of injury. Normal range of motion. Cervical back: Normal range of motion and neck supple. Right lower leg: No edema. Left lower leg: No edema. Comments: Range of motion intact in all extremities   Lymphadenopathy:      Cervical: No cervical adenopathy. Skin:     General: Skin is warm and dry. Capillary Refill: Capillary refill takes less than 2 seconds. Findings: No erythema or rash. Neurological:      General: No focal deficit present. Mental Status: He is alert and oriented to person, place, and time. Mental status is at baseline. Sensory: No sensory deficit. Motor: No abnormal muscle tone. Coordination: Coordination normal.   Psychiatric:         Mood and Affect: Mood normal.         Behavior: Behavior normal.         Thought Content: Thought content normal.         Judgment: Judgment normal.      Comments:       Discussed use, benefit, and side effects of prescribed medications. Barriers to medication compliance addressed. All patient questions answered. Pt voiced understanding. RTC Return in about 1 year (around 11/7/2023) for annual physical .    No future appointments.       Rex Santana MD  11/7/2022  4:14 PM

## 2022-11-07 NOTE — PATIENT INSTRUCTIONS
Well Visit, Ages 25 to 72: Care Instructions  Well visits can help you stay healthy. Your doctor has checked your overall health and may have suggested ways to take good care of yourself. Your doctor also may have recommended tests. You can help prevent illness with healthy eating, good sleep, vaccinations, regular exercise, and other steps. Get the tests that you and your doctor decide on. Depending on your age and risks, examples might include screening for diabetes; hepatitis C; HIV; and cervical, breast, lung, and colon cancer. Screening helps find diseases before any symptoms appear. Eat healthy foods. Choose fruits, vegetables, whole grains, lean protein, and low-fat dairy foods. Limit saturated fat and reduce salt. Limit alcohol. Men should have no more than 2 drinks a day. Women should have no more than 1. For some people, no alcohol is the best choice. Exercise. Get at least 30 minutes of exercise on most days of the week. Walking can be a good choice. Reach and stay at your healthy weight. This will lower your risk for many health problems. Take care of your mental health. Try to stay connected with friends, family, and community, and find ways to manage stress. If you're feeling depressed or hopeless, talk to someone. A counselor can help. If you don't have a counselor, talk to your doctor. Talk to your doctor if you think you may have a problem with alcohol or drug use. This includes prescription medicines and illegal drugs. Avoid tobacco and nicotine: Don't smoke, vape, or chew. If you need help quitting, talk to your doctor. Practice safer sex. Getting tested, using condoms or dental dams, and limiting sex partners can help prevent STIs. Use birth control if it's important to you to prevent pregnancy. Talk with your doctor about your choices and what might be best for you. Prevent problems where you can.  Protect your skin from too much sun, wash your hands, brush your teeth twice a

## 2022-12-30 DIAGNOSIS — F41.9 ANXIETY: ICD-10-CM

## 2022-12-30 DIAGNOSIS — F33.42 RECURRENT MAJOR DEPRESSIVE DISORDER, IN FULL REMISSION (HCC): ICD-10-CM

## 2023-01-02 RX ORDER — BUPROPION HYDROCHLORIDE 300 MG/1
300 TABLET ORAL EVERY MORNING
Qty: 30 TABLET | Refills: 2 | Status: SHIPPED | OUTPATIENT
Start: 2023-01-02

## 2023-04-06 ENCOUNTER — OFFICE VISIT (OUTPATIENT)
Dept: PRIMARY CARE CLINIC | Age: 28
End: 2023-04-06
Payer: MEDICAID

## 2023-04-06 VITALS
HEART RATE: 82 BPM | DIASTOLIC BLOOD PRESSURE: 79 MMHG | SYSTOLIC BLOOD PRESSURE: 130 MMHG | WEIGHT: 194.6 LBS | TEMPERATURE: 98.3 F | BODY MASS INDEX: 28.74 KG/M2

## 2023-04-06 DIAGNOSIS — J02.9 SORE THROAT: ICD-10-CM

## 2023-04-06 DIAGNOSIS — Z00.00 ENCOUNTER FOR WELL ADULT EXAM WITHOUT ABNORMAL FINDINGS: ICD-10-CM

## 2023-04-06 DIAGNOSIS — R41.840 LACK OF CONCENTRATION: ICD-10-CM

## 2023-04-06 DIAGNOSIS — F41.1 GAD (GENERALIZED ANXIETY DISORDER): ICD-10-CM

## 2023-04-06 DIAGNOSIS — F33.42 RECURRENT MAJOR DEPRESSIVE DISORDER, IN FULL REMISSION (HCC): ICD-10-CM

## 2023-04-06 DIAGNOSIS — R53.83 LOW ENERGY: ICD-10-CM

## 2023-04-06 DIAGNOSIS — R53.83 LOW ENERGY: Primary | ICD-10-CM

## 2023-04-06 LAB — S PYO AG THROAT QL: NORMAL

## 2023-04-06 PROCEDURE — 99214 OFFICE O/P EST MOD 30 MIN: CPT | Performed by: FAMILY MEDICINE

## 2023-04-06 PROCEDURE — 1036F TOBACCO NON-USER: CPT | Performed by: FAMILY MEDICINE

## 2023-04-06 PROCEDURE — 87880 STREP A ASSAY W/OPTIC: CPT | Performed by: FAMILY MEDICINE

## 2023-04-06 PROCEDURE — G8419 CALC BMI OUT NRM PARAM NOF/U: HCPCS | Performed by: FAMILY MEDICINE

## 2023-04-06 PROCEDURE — G8427 DOCREV CUR MEDS BY ELIG CLIN: HCPCS | Performed by: FAMILY MEDICINE

## 2023-04-06 ASSESSMENT — PATIENT HEALTH QUESTIONNAIRE - PHQ9
8. MOVING OR SPEAKING SO SLOWLY THAT OTHER PEOPLE COULD HAVE NOTICED. OR THE OPPOSITE, BEING SO FIGETY OR RESTLESS THAT YOU HAVE BEEN MOVING AROUND A LOT MORE THAN USUAL: 0
3. TROUBLE FALLING OR STAYING ASLEEP: 0
SUM OF ALL RESPONSES TO PHQ QUESTIONS 1-9: 8
4. FEELING TIRED OR HAVING LITTLE ENERGY: 1
SUM OF ALL RESPONSES TO PHQ9 QUESTIONS 1 & 2: 3
2. FEELING DOWN, DEPRESSED OR HOPELESS: 1
SUM OF ALL RESPONSES TO PHQ QUESTIONS 1-9: 8
1. LITTLE INTEREST OR PLEASURE IN DOING THINGS: 2
9. THOUGHTS THAT YOU WOULD BE BETTER OFF DEAD, OR OF HURTING YOURSELF: 0
5. POOR APPETITE OR OVEREATING: 1
6. FEELING BAD ABOUT YOURSELF - OR THAT YOU ARE A FAILURE OR HAVE LET YOURSELF OR YOUR FAMILY DOWN: 0
7. TROUBLE CONCENTRATING ON THINGS, SUCH AS READING THE NEWSPAPER OR WATCHING TELEVISION: 3
SUM OF ALL RESPONSES TO PHQ QUESTIONS 1-9: 8
SUM OF ALL RESPONSES TO PHQ QUESTIONS 1-9: 8

## 2023-04-06 ASSESSMENT — ENCOUNTER SYMPTOMS
ABDOMINAL PAIN: 0
DIARRHEA: 0
SHORTNESS OF BREATH: 0
COUGH: 0
EYE REDNESS: 0
WHEEZING: 0
CHEST TIGHTNESS: 0

## 2023-04-06 NOTE — PROGRESS NOTES
1     No current facility-administered medications on file prior to visit. No Known Allergies    OBJECTIVE:  /79   Pulse 82   Temp 98.3 °F (36.8 °C) (Temporal)   Wt 194 lb 9.6 oz (88.3 kg)   BMI 28.74 kg/m²   BP Readings from Last 2 Encounters:   04/06/23 130/79   11/07/22 126/80     Wt Readings from Last 3 Encounters:   04/06/23 194 lb 9.6 oz (88.3 kg)   11/07/22 199 lb 3.2 oz (90.4 kg)   12/02/21 189 lb 12.8 oz (86.1 kg)       Physical Exam  Vitals and nursing note reviewed. Constitutional:       General: He is not in acute distress. Appearance: Normal appearance. He is well-developed. He is not ill-appearing. HENT:      Head: Normocephalic and atraumatic. Right Ear: Tympanic membrane, ear canal and external ear normal. There is no impacted cerumen. Left Ear: Tympanic membrane, ear canal and external ear normal. There is no impacted cerumen. Nose: Nose normal. No congestion or rhinorrhea. Mouth/Throat:      Mouth: Mucous membranes are moist.      Pharynx: Oropharynx is clear. No oropharyngeal exudate or posterior oropharyngeal erythema. Eyes:      General: No scleral icterus. Right eye: No discharge. Left eye: No discharge. Extraocular Movements: Extraocular movements intact. Conjunctiva/sclera: Conjunctivae normal.      Pupils: Pupils are equal, round, and reactive to light. Cardiovascular:      Rate and Rhythm: Normal rate and regular rhythm. Pulses: Normal pulses. Heart sounds: Normal heart sounds. No murmur heard. Comments: Normal radial and pedal pulses  Pulmonary:      Effort: Pulmonary effort is normal. No respiratory distress. Breath sounds: Normal breath sounds. No wheezing. Chest:      Chest wall: No tenderness. Abdominal:      General: Bowel sounds are normal. There is no distension. Palpations: Abdomen is soft. There is no mass. Tenderness: There is no abdominal tenderness.       Comments: Normal

## 2023-04-07 LAB
25(OH)D3 SERPL-MCNC: 18.6 NG/ML
ALBUMIN SERPL-MCNC: 4.9 G/DL (ref 3.4–5)
ALBUMIN/GLOB SERPL: 2 {RATIO} (ref 1.1–2.2)
ALP SERPL-CCNC: 91 U/L (ref 40–129)
ALT SERPL-CCNC: 36 U/L (ref 10–40)
ANION GAP SERPL CALCULATED.3IONS-SCNC: 18 MMOL/L (ref 3–16)
AST SERPL-CCNC: 19 U/L (ref 15–37)
BASOPHILS # BLD: 0.3 K/UL (ref 0–0.2)
BASOPHILS NFR BLD: 4 %
BILIRUB SERPL-MCNC: 0.6 MG/DL (ref 0–1)
BUN SERPL-MCNC: 9 MG/DL (ref 7–20)
CALCIUM SERPL-MCNC: 9.7 MG/DL (ref 8.3–10.6)
CHLORIDE SERPL-SCNC: 102 MMOL/L (ref 99–110)
CHOLEST SERPL-MCNC: 222 MG/DL (ref 0–199)
CO2 SERPL-SCNC: 22 MMOL/L (ref 21–32)
CREAT SERPL-MCNC: 0.9 MG/DL (ref 0.9–1.3)
DEPRECATED RDW RBC AUTO: 12.9 % (ref 12.4–15.4)
EOSINOPHIL # BLD: 0.3 K/UL (ref 0–0.6)
EOSINOPHIL NFR BLD: 3 %
FERRITIN SERPL IA-MCNC: 160.4 NG/ML (ref 30–400)
FOLATE SERPL-MCNC: 10.84 NG/ML (ref 4.78–24.2)
GFR SERPLBLD CREATININE-BSD FMLA CKD-EPI: >60 ML/MIN/{1.73_M2}
GLUCOSE SERPL-MCNC: 85 MG/DL (ref 70–99)
HCT VFR BLD AUTO: 45 % (ref 40.5–52.5)
HDLC SERPL-MCNC: 44 MG/DL (ref 40–60)
HGB BLD-MCNC: 15.5 G/DL (ref 13.5–17.5)
IRON SATN MFR SERPL: 47 % (ref 20–50)
IRON SERPL-MCNC: 141 UG/DL (ref 59–158)
LDLC SERPL CALC-MCNC: 140 MG/DL
LYMPHOCYTES # BLD: 2.2 K/UL (ref 1–5.1)
LYMPHOCYTES NFR BLD: 24 %
MCH RBC QN AUTO: 29.7 PG (ref 26–34)
MCHC RBC AUTO-ENTMCNC: 34.4 G/DL (ref 31–36)
MCV RBC AUTO: 86.4 FL (ref 80–100)
MONOCYTES # BLD: 0.9 K/UL (ref 0–1.3)
MONOCYTES NFR BLD: 10 %
NEUTROPHILS # BLD: 5 K/UL (ref 1.7–7.7)
NEUTROPHILS NFR BLD: 56 %
NEUTS BAND NFR BLD MANUAL: 2 % (ref 0–7)
PATH INTERP BLD-IMP: NO
PLATELET # BLD AUTO: 276 K/UL (ref 135–450)
PMV BLD AUTO: 8.7 FL (ref 5–10.5)
POTASSIUM SERPL-SCNC: 4.6 MMOL/L (ref 3.5–5.1)
PROT SERPL-MCNC: 7.4 G/DL (ref 6.4–8.2)
RBC # BLD AUTO: 5.21 M/UL (ref 4.2–5.9)
RBC MORPH BLD: NORMAL
SODIUM SERPL-SCNC: 142 MMOL/L (ref 136–145)
TIBC SERPL-MCNC: 302 UG/DL (ref 260–445)
TRANSFERRIN SERPL-MCNC: 262 MG/DL (ref 200–360)
TRIGL SERPL-MCNC: 192 MG/DL (ref 0–150)
TSH SERPL DL<=0.005 MIU/L-ACNC: 3.18 UIU/ML (ref 0.27–4.2)
VARIANT LYMPHS NFR BLD MANUAL: 1 % (ref 0–6)
VIT B12 SERPL-MCNC: 298 PG/ML (ref 211–911)
VLDLC SERPL CALC-MCNC: 38 MG/DL
WBC # BLD AUTO: 8.6 K/UL (ref 4–11)

## 2023-04-16 DIAGNOSIS — F33.42 RECURRENT MAJOR DEPRESSIVE DISORDER, IN FULL REMISSION (HCC): ICD-10-CM

## 2023-04-16 DIAGNOSIS — F41.9 ANXIETY: ICD-10-CM

## 2023-04-17 RX ORDER — BUPROPION HYDROCHLORIDE 300 MG/1
TABLET ORAL
Qty: 30 TABLET | Refills: 2 | Status: SHIPPED | OUTPATIENT
Start: 2023-04-17

## 2023-04-17 NOTE — TELEPHONE ENCOUNTER
Medication:   Requested Prescriptions     Pending Prescriptions Disp Refills    buPROPion (WELLBUTRIN XL) 300 MG extended release tablet [Pharmacy Med Name: buPROPion HCL  MG TABLET] 30 tablet 2     Sig: TAKE ONE TABLET BY MOUTH EVERY MORNING        Last Filled:  01/02/23    Patient Phone Number: [phone] (home)     Last appt: 4/6/2023   Next appt: 5/4/2023    Last OARRS: No flowsheet data found.

## 2023-05-04 ENCOUNTER — TELEMEDICINE (OUTPATIENT)
Dept: PRIMARY CARE CLINIC | Age: 28
End: 2023-05-04
Payer: MEDICAID

## 2023-05-04 DIAGNOSIS — F41.1 GAD (GENERALIZED ANXIETY DISORDER): Primary | ICD-10-CM

## 2023-05-04 DIAGNOSIS — F33.42 RECURRENT MAJOR DEPRESSIVE DISORDER, IN FULL REMISSION (HCC): ICD-10-CM

## 2023-05-04 PROCEDURE — G8427 DOCREV CUR MEDS BY ELIG CLIN: HCPCS | Performed by: FAMILY MEDICINE

## 2023-05-04 PROCEDURE — G8419 CALC BMI OUT NRM PARAM NOF/U: HCPCS | Performed by: FAMILY MEDICINE

## 2023-05-04 PROCEDURE — 99214 OFFICE O/P EST MOD 30 MIN: CPT | Performed by: FAMILY MEDICINE

## 2023-05-04 PROCEDURE — 96127 BRIEF EMOTIONAL/BEHAV ASSMT: CPT | Performed by: FAMILY MEDICINE

## 2023-05-04 PROCEDURE — 1036F TOBACCO NON-USER: CPT | Performed by: FAMILY MEDICINE

## 2023-05-04 ASSESSMENT — PATIENT HEALTH QUESTIONNAIRE - PHQ9
SUM OF ALL RESPONSES TO PHQ QUESTIONS 1-9: 1
SUM OF ALL RESPONSES TO PHQ QUESTIONS 1-9: 1
1. LITTLE INTEREST OR PLEASURE IN DOING THINGS: 0
SUM OF ALL RESPONSES TO PHQ9 QUESTIONS 1 & 2: 0
5. POOR APPETITE OR OVEREATING: 0
SUM OF ALL RESPONSES TO PHQ QUESTIONS 1-9: 1
8. MOVING OR SPEAKING SO SLOWLY THAT OTHER PEOPLE COULD HAVE NOTICED. OR THE OPPOSITE, BEING SO FIGETY OR RESTLESS THAT YOU HAVE BEEN MOVING AROUND A LOT MORE THAN USUAL: 0
4. FEELING TIRED OR HAVING LITTLE ENERGY: 0
3. TROUBLE FALLING OR STAYING ASLEEP: 0
7. TROUBLE CONCENTRATING ON THINGS, SUCH AS READING THE NEWSPAPER OR WATCHING TELEVISION: 1
6. FEELING BAD ABOUT YOURSELF - OR THAT YOU ARE A FAILURE OR HAVE LET YOURSELF OR YOUR FAMILY DOWN: 0
2. FEELING DOWN, DEPRESSED OR HOPELESS: 0
SUM OF ALL RESPONSES TO PHQ QUESTIONS 1-9: 1
9. THOUGHTS THAT YOU WOULD BE BETTER OFF DEAD, OR OF HURTING YOURSELF: 0

## 2023-05-04 ASSESSMENT — ANXIETY QUESTIONNAIRES
6. BECOMING EASILY ANNOYED OR IRRITABLE: 1
2. NOT BEING ABLE TO STOP OR CONTROL WORRYING: 0
GAD7 TOTAL SCORE: 2
5. BEING SO RESTLESS THAT IT IS HARD TO SIT STILL: 0
3. WORRYING TOO MUCH ABOUT DIFFERENT THINGS: 0
1. FEELING NERVOUS, ANXIOUS, OR ON EDGE: 0
7. FEELING AFRAID AS IF SOMETHING AWFUL MIGHT HAPPEN: 0
4. TROUBLE RELAXING: 1

## 2023-05-04 ASSESSMENT — ENCOUNTER SYMPTOMS
ABDOMINAL PAIN: 0
COUGH: 0
WHEEZING: 0
SHORTNESS OF BREATH: 0
DIARRHEA: 0
EYE REDNESS: 0
CHEST TIGHTNESS: 0

## 2023-05-04 NOTE — PROGRESS NOTES
2023    TELEHEALTH EVALUATION -- Audio/Visual (During IRRUZ-27 public health emergency)    HPI:    Ketan Gross (:  1995) has requested an audio/video evaluation for the following concern(s):    Increased zoloft 50 mg daily. He is feeling well and less sad. His energy level has also improved. ROHITH 7 SCORE 2023   ROHITH-7 Total Score 2 4 10     PHQ-9 Total Score: 1 (2023  1:05 PM)  Thoughts that you would be better off dead, or of hurting yourself in some way: 0 (2023  1:05 PM)      Review of Systems   Constitutional:  Negative for activity change, chills, fatigue and fever. HENT:  Negative for congestion. Eyes:  Negative for redness. Respiratory:  Negative for cough, chest tightness, shortness of breath and wheezing. Cardiovascular:  Negative for chest pain and palpitations. Gastrointestinal:  Negative for abdominal pain and diarrhea. Genitourinary:  Negative for difficulty urinating. Musculoskeletal:  Negative for arthralgias. Skin:  Negative for rash. Allergic/Immunologic: Negative for immunocompromised state. Neurological:  Negative for dizziness, light-headedness and headaches. Hematological:  Negative for adenopathy. Psychiatric/Behavioral:  Negative for behavioral problems. Prior to Visit Medications    Medication Sig Taking?  Authorizing Provider   buPROPion (WELLBUTRIN XL) 300 MG extended release tablet TAKE ONE TABLET BY MOUTH EVERY MORNING Yes Dhruv Marcus MD   sertraline (ZOLOFT) 50 MG tablet Take 1 tablet by mouth daily Yes Dhruv Marcus MD   hydrOXYzine (VISTARIL) 50 MG capsule Take 1 capsule by mouth 3 times daily as needed for Anxiety Yes Dhruv Marcus MD       Social History     Tobacco Use    Smoking status: Never    Smokeless tobacco: Never   Vaping Use    Vaping Use: Never used   Substance Use Topics    Alcohol use: Yes    Drug use: Never        No Known Allergies,   Past Medical History:   Diagnosis

## 2023-07-30 DIAGNOSIS — F33.42 RECURRENT MAJOR DEPRESSIVE DISORDER, IN FULL REMISSION (HCC): ICD-10-CM

## 2023-07-30 DIAGNOSIS — F41.9 ANXIETY: ICD-10-CM

## 2023-07-31 RX ORDER — BUPROPION HYDROCHLORIDE 300 MG/1
300 TABLET ORAL EVERY MORNING
Qty: 30 TABLET | Refills: 2 | Status: SHIPPED | OUTPATIENT
Start: 2023-07-31

## 2023-07-31 NOTE — TELEPHONE ENCOUNTER
Medication:   Requested Prescriptions     Pending Prescriptions Disp Refills    buPROPion (WELLBUTRIN XL) 300 MG extended release tablet 30 tablet 2     Sig: Take 1 tablet by mouth every morning        Last Filled:  4/17/23    Patient Phone Number: [phone] (home)     Last appt: 5/4/2023 - Return in about 3 months (around 8/4/2023). Next appt: Visit date not found    Last OARRS: No flowsheet data found.

## 2023-08-22 DIAGNOSIS — F33.42 RECURRENT MAJOR DEPRESSIVE DISORDER, IN FULL REMISSION (HCC): ICD-10-CM

## 2023-08-22 DIAGNOSIS — F41.1 GAD (GENERALIZED ANXIETY DISORDER): ICD-10-CM

## 2023-08-22 NOTE — TELEPHONE ENCOUNTER
Medication:   Requested Prescriptions     Pending Prescriptions Disp Refills    sertraline (ZOLOFT) 50 MG tablet 90 tablet 1     Sig: Take 1 tablet by mouth daily        Last Filled:    4/6/23  Patient Phone Number: [phone] (home)     Last appt: 5/4/2023 Return in about 3 months (around 8/4/2023). Next appt: Visit date not found    Last OARRS: No flowsheet data found.

## 2023-11-12 DIAGNOSIS — F41.9 ANXIETY: ICD-10-CM

## 2023-11-12 DIAGNOSIS — F33.42 RECURRENT MAJOR DEPRESSIVE DISORDER, IN FULL REMISSION (HCC): ICD-10-CM

## 2023-11-13 RX ORDER — BUPROPION HYDROCHLORIDE 300 MG/1
300 TABLET ORAL EVERY MORNING
Qty: 30 TABLET | Refills: 2 | OUTPATIENT
Start: 2023-11-13

## 2023-11-13 SDOH — HEALTH STABILITY: PHYSICAL HEALTH: ON AVERAGE, HOW MANY MINUTES DO YOU ENGAGE IN EXERCISE AT THIS LEVEL?: 30 MIN

## 2023-11-13 SDOH — HEALTH STABILITY: PHYSICAL HEALTH: ON AVERAGE, HOW MANY DAYS PER WEEK DO YOU ENGAGE IN MODERATE TO STRENUOUS EXERCISE (LIKE A BRISK WALK)?: 1 DAY

## 2023-11-13 ASSESSMENT — SOCIAL DETERMINANTS OF HEALTH (SDOH)
WITHIN THE LAST YEAR, HAVE TO BEEN RAPED OR FORCED TO HAVE ANY KIND OF SEXUAL ACTIVITY BY YOUR PARTNER OR EX-PARTNER?: NO
WITHIN THE LAST YEAR, HAVE YOU BEEN KICKED, HIT, SLAPPED, OR OTHERWISE PHYSICALLY HURT BY YOUR PARTNER OR EX-PARTNER?: NO
WITHIN THE LAST YEAR, HAVE YOU BEEN AFRAID OF YOUR PARTNER OR EX-PARTNER?: NO
WITHIN THE LAST YEAR, HAVE YOU BEEN HUMILIATED OR EMOTIONALLY ABUSED IN OTHER WAYS BY YOUR PARTNER OR EX-PARTNER?: NO

## 2023-11-16 ENCOUNTER — OFFICE VISIT (OUTPATIENT)
Dept: PRIMARY CARE CLINIC | Age: 28
End: 2023-11-16

## 2023-11-16 VITALS
TEMPERATURE: 97.9 F | HEART RATE: 82 BPM | BODY MASS INDEX: 29.33 KG/M2 | DIASTOLIC BLOOD PRESSURE: 78 MMHG | HEIGHT: 69 IN | OXYGEN SATURATION: 99 % | SYSTOLIC BLOOD PRESSURE: 139 MMHG | WEIGHT: 198 LBS

## 2023-11-16 DIAGNOSIS — Z23 NEED FOR IMMUNIZATION AGAINST INFLUENZA: ICD-10-CM

## 2023-11-16 DIAGNOSIS — F41.1 GAD (GENERALIZED ANXIETY DISORDER): ICD-10-CM

## 2023-11-16 DIAGNOSIS — F33.42 RECURRENT MAJOR DEPRESSIVE DISORDER, IN FULL REMISSION (HCC): ICD-10-CM

## 2023-11-16 DIAGNOSIS — Z00.00 ROUTINE GENERAL MEDICAL EXAMINATION AT A HEALTH CARE FACILITY: Primary | ICD-10-CM

## 2023-11-16 RX ORDER — BUPROPION HYDROCHLORIDE 300 MG/1
300 TABLET ORAL EVERY MORNING
Qty: 90 TABLET | Refills: 1 | Status: SHIPPED | OUTPATIENT
Start: 2023-11-16

## 2023-11-16 SDOH — ECONOMIC STABILITY: FOOD INSECURITY: WITHIN THE PAST 12 MONTHS, THE FOOD YOU BOUGHT JUST DIDN'T LAST AND YOU DIDN'T HAVE MONEY TO GET MORE.: NEVER TRUE

## 2023-11-16 SDOH — ECONOMIC STABILITY: INCOME INSECURITY: HOW HARD IS IT FOR YOU TO PAY FOR THE VERY BASICS LIKE FOOD, HOUSING, MEDICAL CARE, AND HEATING?: NOT HARD AT ALL

## 2023-11-16 SDOH — ECONOMIC STABILITY: FOOD INSECURITY: WITHIN THE PAST 12 MONTHS, YOU WORRIED THAT YOUR FOOD WOULD RUN OUT BEFORE YOU GOT MONEY TO BUY MORE.: NEVER TRUE

## 2023-11-16 SDOH — ECONOMIC STABILITY: HOUSING INSECURITY
IN THE LAST 12 MONTHS, WAS THERE A TIME WHEN YOU DID NOT HAVE A STEADY PLACE TO SLEEP OR SLEPT IN A SHELTER (INCLUDING NOW)?: NO

## 2023-11-16 ASSESSMENT — ENCOUNTER SYMPTOMS
SHORTNESS OF BREATH: 0
BLOOD IN STOOL: 0
COUGH: 0

## 2023-11-16 NOTE — PROGRESS NOTES
2023    Faith Moncada (:  1995) is a 32 y.o. male, here for evaluation of the following medical concerns:    HPI    Well Adult Physical: Patient here for a comprehensive physical exam.The patient reports no problems  Do you take any herbs or supplements that were not prescribed by a doctor? no Are you taking calcium supplements? not applicable Are you taking aspirin daily? not applicable    Sexual activity: single partner, contraception - none, trying to conceive!!!  Diet: Healthy  Exercise: walks 3 time(s) per week  Seatbelt use: yes    Review of Systems   Constitutional:  Negative for activity change, fatigue and unexpected weight change. HENT:  Negative for hearing loss. Eyes:  Negative for visual disturbance. Respiratory:  Negative for cough and shortness of breath. Cardiovascular:  Negative for chest pain and leg swelling. Gastrointestinal:  Negative for blood in stool. Endocrine: Negative for polydipsia and polyphagia. Genitourinary:  Negative for dysuria, frequency, penile discharge, penile pain, scrotal swelling and testicular pain. Musculoskeletal:  Negative for arthralgias. Skin:  Negative for rash. Allergic/Immunologic: Negative for environmental allergies. Neurological:  Negative for dizziness and headaches. Hematological:  Does not bruise/bleed easily. Psychiatric/Behavioral:  Negative for dysphoric mood and sleep disturbance. The patient is not nervous/anxious. Prior to Visit Medications    Medication Sig Taking?  Authorizing Provider   buPROPion (WELLBUTRIN XL) 300 MG extended release tablet Take 1 tablet by mouth every morning Yes Carlos Varner,    sertraline (ZOLOFT) 50 MG tablet Take 1 tablet by mouth daily Yes Carlos Varner,    hydrOXYzine (VISTARIL) 50 MG capsule Take 1 capsule by mouth 3 times daily as needed for Anxiety Yes Gabriela Goins MD        No Known Allergies    Past Medical History:   Diagnosis Date    History of fracture of

## 2023-11-16 NOTE — PATIENT INSTRUCTIONS
Patient Education        Well Visit, Ages 25 to 72: Care Instructions  Well visits can help you stay healthy. Your doctor has checked your overall health and may have suggested ways to take good care of yourself. Your doctor also may have recommended tests. You can help prevent illness with healthy eating, good sleep, vaccinations, regular exercise, and other steps. Get the tests that you and your doctor decide on. Depending on your age and risks, examples might include screening for diabetes; hepatitis C; HIV; and cervical, breast, lung, and colon cancer. Screening helps find diseases before any symptoms appear. Eat healthy foods. Choose fruits, vegetables, whole grains, lean protein, and low-fat dairy foods. Limit saturated fat and reduce salt. Limit alcohol. Men should have no more than 2 drinks a day. Women should have no more than 1. For some people, no alcohol is the best choice. Exercise. Get at least 30 minutes of exercise on most days of the week. Walking can be a good choice. Reach and stay at your healthy weight. This will lower your risk for many health problems. Take care of your mental health. Try to stay connected with friends, family, and community, and find ways to manage stress. If you're feeling depressed or hopeless, talk to someone. A counselor can help. If you don't have a counselor, talk to your doctor. Talk to your doctor if you think you may have a problem with alcohol or drug use. This includes prescription medicines and illegal drugs. Avoid tobacco and nicotine: Don't smoke, vape, or chew. If you need help quitting, talk to your doctor. Practice safer sex. Getting tested, using condoms or dental dams, and limiting sex partners can help prevent STIs. Use birth control if it's important to you to prevent pregnancy. Talk with your doctor about your choices and what might be best for you. Prevent problems where you can.  Protect your skin from too much sun,
No

## 2024-03-26 ENCOUNTER — OFFICE VISIT (OUTPATIENT)
Dept: PRIMARY CARE CLINIC | Age: 29
End: 2024-03-26
Payer: COMMERCIAL

## 2024-03-26 VITALS
TEMPERATURE: 97.9 F | WEIGHT: 198.2 LBS | BODY MASS INDEX: 29.36 KG/M2 | HEIGHT: 69 IN | HEART RATE: 94 BPM | SYSTOLIC BLOOD PRESSURE: 132 MMHG | DIASTOLIC BLOOD PRESSURE: 84 MMHG

## 2024-03-26 DIAGNOSIS — F41.1 GAD (GENERALIZED ANXIETY DISORDER): ICD-10-CM

## 2024-03-26 DIAGNOSIS — F33.0 MILD EPISODE OF RECURRENT MAJOR DEPRESSIVE DISORDER (HCC): Primary | ICD-10-CM

## 2024-03-26 PROCEDURE — G8419 CALC BMI OUT NRM PARAM NOF/U: HCPCS | Performed by: STUDENT IN AN ORGANIZED HEALTH CARE EDUCATION/TRAINING PROGRAM

## 2024-03-26 PROCEDURE — G8427 DOCREV CUR MEDS BY ELIG CLIN: HCPCS | Performed by: STUDENT IN AN ORGANIZED HEALTH CARE EDUCATION/TRAINING PROGRAM

## 2024-03-26 PROCEDURE — 1036F TOBACCO NON-USER: CPT | Performed by: STUDENT IN AN ORGANIZED HEALTH CARE EDUCATION/TRAINING PROGRAM

## 2024-03-26 PROCEDURE — G8482 FLU IMMUNIZE ORDER/ADMIN: HCPCS | Performed by: STUDENT IN AN ORGANIZED HEALTH CARE EDUCATION/TRAINING PROGRAM

## 2024-03-26 PROCEDURE — 99214 OFFICE O/P EST MOD 30 MIN: CPT | Performed by: STUDENT IN AN ORGANIZED HEALTH CARE EDUCATION/TRAINING PROGRAM

## 2024-03-26 RX ORDER — SERTRALINE HYDROCHLORIDE 100 MG/1
100 TABLET, FILM COATED ORAL DAILY
Qty: 90 TABLET | Refills: 1 | Status: SHIPPED | OUTPATIENT
Start: 2024-03-26

## 2024-03-26 RX ORDER — BUPROPION HYDROCHLORIDE 300 MG/1
300 TABLET ORAL EVERY MORNING
Qty: 90 TABLET | Refills: 1 | Status: SHIPPED | OUTPATIENT
Start: 2024-03-26

## 2024-03-26 ASSESSMENT — PATIENT HEALTH QUESTIONNAIRE - PHQ9
10. IF YOU CHECKED OFF ANY PROBLEMS, HOW DIFFICULT HAVE THESE PROBLEMS MADE IT FOR YOU TO DO YOUR WORK, TAKE CARE OF THINGS AT HOME, OR GET ALONG WITH OTHER PEOPLE: SOMEWHAT DIFFICULT
4. FEELING TIRED OR HAVING LITTLE ENERGY: SEVERAL DAYS
6. FEELING BAD ABOUT YOURSELF - OR THAT YOU ARE A FAILURE OR HAVE LET YOURSELF OR YOUR FAMILY DOWN: NOT AT ALL
5. POOR APPETITE OR OVEREATING: MORE THAN HALF THE DAYS
6. FEELING BAD ABOUT YOURSELF - OR THAT YOU ARE A FAILURE OR HAVE LET YOURSELF OR YOUR FAMILY DOWN: NOT AT ALL
SUM OF ALL RESPONSES TO PHQ QUESTIONS 1-9: 6
5. POOR APPETITE OR OVEREATING: MORE THAN HALF THE DAYS
SUM OF ALL RESPONSES TO PHQ QUESTIONS 1-9: 6
7. TROUBLE CONCENTRATING ON THINGS, SUCH AS READING THE NEWSPAPER OR WATCHING TELEVISION: SEVERAL DAYS
8. MOVING OR SPEAKING SO SLOWLY THAT OTHER PEOPLE COULD HAVE NOTICED. OR THE OPPOSITE, BEING SO FIGETY OR RESTLESS THAT YOU HAVE BEEN MOVING AROUND A LOT MORE THAN USUAL: NOT AT ALL
1. LITTLE INTEREST OR PLEASURE IN DOING THINGS: SEVERAL DAYS
8. MOVING OR SPEAKING SO SLOWLY THAT OTHER PEOPLE COULD HAVE NOTICED. OR THE OPPOSITE - BEING SO FIDGETY OR RESTLESS THAT YOU HAVE BEEN MOVING AROUND A LOT MORE THAN USUAL: NOT AT ALL
3. TROUBLE FALLING OR STAYING ASLEEP: NOT AT ALL
4. FEELING TIRED OR HAVING LITTLE ENERGY: SEVERAL DAYS
SUM OF ALL RESPONSES TO PHQ QUESTIONS 1-9: 6
SUM OF ALL RESPONSES TO PHQ9 QUESTIONS 1 & 2: 2
10. IF YOU CHECKED OFF ANY PROBLEMS, HOW DIFFICULT HAVE THESE PROBLEMS MADE IT FOR YOU TO DO YOUR WORK, TAKE CARE OF THINGS AT HOME, OR GET ALONG WITH OTHER PEOPLE: SOMEWHAT DIFFICULT
2. FEELING DOWN, DEPRESSED OR HOPELESS: SEVERAL DAYS
9. THOUGHTS THAT YOU WOULD BE BETTER OFF DEAD, OR OF HURTING YOURSELF: NOT AT ALL
7. TROUBLE CONCENTRATING ON THINGS, SUCH AS READING THE NEWSPAPER OR WATCHING TELEVISION: SEVERAL DAYS
9. THOUGHTS THAT YOU WOULD BE BETTER OFF DEAD, OR OF HURTING YOURSELF: NOT AT ALL
3. TROUBLE FALLING OR STAYING ASLEEP: NOT AT ALL
2. FEELING DOWN, DEPRESSED OR HOPELESS: SEVERAL DAYS
1. LITTLE INTEREST OR PLEASURE IN DOING THINGS: SEVERAL DAYS

## 2024-03-26 ASSESSMENT — ENCOUNTER SYMPTOMS
VOMITING: 0
NAUSEA: 0

## 2024-03-26 NOTE — PATIENT INSTRUCTIONS
Patient Education        Recovering From Depression: Care Instructions  Your Care Instructions     Taking good care of yourself is important as you recover from depression. In time, your symptoms will fade as your treatment takes hold. Do not give up. Instead, focus your energy on getting better.  Your mood will improve. It just takes some time. Focus on things that can help you feel better, such as being with friends and family, eating well, and getting enough rest. But take things slowly. Do not do too much too soon. You will begin to feel better gradually.  Follow-up care is a key part of your treatment and safety. Be sure to make and go to all appointments, and call your doctor if you are having problems. It's also a good idea to know your test results and keep a list of the medicines you take.  How can you care for yourself at home?  Be realistic  If you have a large task to do, break it up into smaller steps you can handle, and just do what you can.  You may want to put off important decisions until your depression has lifted. If you have plans that will have a major impact on your life, such as marriage, divorce, or a job change, try to wait a bit. Talk it over with friends and loved ones who can help you look at the overall picture first.  Reaching out to people for help is important. Do not isolate yourself. Let your family and friends help you. Find someone you can trust and confide in, and talk to that person.  Be patient, and be kind to yourself. Remember that depression is not your fault and is not something you can overcome with willpower alone. Treatment is important for depression, just like for any other illness. Feeling better takes time, and your mood will improve little by little.  Stay active  Stay busy and get outside. Take a walk, or try some other light exercise.  Talk with your doctor about an exercise program. Exercise can help with mild depression.  Go to a movie or concert. Take part in a

## 2024-03-26 NOTE — PROGRESS NOTES
Exam  Vitals reviewed.   Constitutional:       Appearance: Normal appearance.   HENT:      Head: Normocephalic and atraumatic.   Cardiovascular:      Rate and Rhythm: Normal rate.   Pulmonary:      Effort: Pulmonary effort is normal.   Skin:     General: Skin is warm and dry.      Capillary Refill: Capillary refill takes less than 2 seconds.   Neurological:      Mental Status: He is alert. Mental status is at baseline.   Psychiatric:         Mood and Affect: Mood is anxious. Affect is flat.         Behavior: Behavior normal.         ASSESSMENT/PLAN:  1. Mild episode of recurrent major depressive disorder (HCC)  Symptoms inadequately managed on current regimen.  Increasing dosage as below.  Follow-up for reevaluation in 4 to 6 weeks.  - buPROPion (WELLBUTRIN XL) 300 MG extended release tablet; Take 1 tablet by mouth every morning  Dispense: 90 tablet; Refill: 1  - sertraline (ZOLOFT) 100 MG tablet; Take 1 tablet by mouth daily  Dispense: 90 tablet; Refill: 1    2. ROHITH (generalized anxiety disorder)  As above.  - sertraline (ZOLOFT) 100 MG tablet; Take 1 tablet by mouth daily  Dispense: 90 tablet; Refill: 1      Return in about 4 weeks (around 4/23/2024) for Depression/Anxiety.    An electronic signature was used to authenticate this note.    --Damian Sánchez DO on 3/26/2024 at 5:07 PM

## 2024-04-04 ENCOUNTER — TELEMEDICINE (OUTPATIENT)
Dept: PRIMARY CARE CLINIC | Age: 29
End: 2024-04-04
Payer: COMMERCIAL

## 2024-04-04 DIAGNOSIS — F41.1 GAD (GENERALIZED ANXIETY DISORDER): ICD-10-CM

## 2024-04-04 DIAGNOSIS — F33.42 RECURRENT MAJOR DEPRESSIVE DISORDER, IN FULL REMISSION (HCC): Primary | ICD-10-CM

## 2024-04-04 PROCEDURE — G8427 DOCREV CUR MEDS BY ELIG CLIN: HCPCS | Performed by: STUDENT IN AN ORGANIZED HEALTH CARE EDUCATION/TRAINING PROGRAM

## 2024-04-04 PROCEDURE — 99214 OFFICE O/P EST MOD 30 MIN: CPT | Performed by: STUDENT IN AN ORGANIZED HEALTH CARE EDUCATION/TRAINING PROGRAM

## 2024-04-04 PROCEDURE — G8419 CALC BMI OUT NRM PARAM NOF/U: HCPCS | Performed by: STUDENT IN AN ORGANIZED HEALTH CARE EDUCATION/TRAINING PROGRAM

## 2024-04-04 PROCEDURE — 1036F TOBACCO NON-USER: CPT | Performed by: STUDENT IN AN ORGANIZED HEALTH CARE EDUCATION/TRAINING PROGRAM

## 2024-04-04 ASSESSMENT — ENCOUNTER SYMPTOMS
VOMITING: 0
NAUSEA: 0

## 2024-04-04 NOTE — PROGRESS NOTES
applicable) is aware that this is a billable service, which includes applicable co-pays. This Virtual Visit was conducted with patient's (and/or legal guardian's) consent. Patient identification was verified, and a caregiver was present when appropriate.   The patient was located at Home: [address] [place] [id number]  Provider was located at Facility (Appt Dept): [address] [place] [address]  [address] [place] [address]  Confirm you are appropriately licensed, registered, or certified to deliver care in the state where the patient is located as indicated above. If you are not or unsure, please re-schedule the visit: Yes, I confirm.        Total time spent for this encounter: Not billed by time    --Damian Sánchez DO on 4/4/2024 at 4:10 PM    An electronic signature was used to authenticate this note.    Pursuant to the emergency declaration under the [place] Act and the National Emergencies Act, 1135 waiver authority and the Coronavirus Preparedness and Response Supplemental Appropriations Act, this Virtual  Visit was conducted, with patient's consent, to reduce the patient's risk of exposure to COVID-19 and provide continuity of care for an established patient.    Services were provided through a video synchronous discussion virtually to substitute for in-person clinic visit.

## 2024-06-02 DIAGNOSIS — F41.1 GAD (GENERALIZED ANXIETY DISORDER): ICD-10-CM

## 2024-06-02 DIAGNOSIS — F33.0 MILD EPISODE OF RECURRENT MAJOR DEPRESSIVE DISORDER (HCC): ICD-10-CM

## 2024-06-03 RX ORDER — SERTRALINE HYDROCHLORIDE 100 MG/1
100 TABLET, FILM COATED ORAL DAILY
Qty: 30 TABLET | Refills: 0 | Status: SHIPPED | OUTPATIENT
Start: 2024-06-03

## 2024-06-03 RX ORDER — BUPROPION HYDROCHLORIDE 300 MG/1
300 TABLET ORAL EVERY MORNING
Qty: 30 TABLET | Refills: 0 | Status: SHIPPED | OUTPATIENT
Start: 2024-06-03

## 2024-06-03 NOTE — TELEPHONE ENCOUNTER
Medication:   Requested Prescriptions     Pending Prescriptions Disp Refills    buPROPion (WELLBUTRIN XL) 300 MG extended release tablet 90 tablet 1     Sig: Take 1 tablet by mouth every morning    sertraline (ZOLOFT) 100 MG tablet 90 tablet 1     Sig: Take 1 tablet by mouth daily        Last Filled:  03/26/24    Patient Phone Number: [phone] (home)     Last appt: 4/4/2024 Return in about 6 weeks (around 5/16/2024). Only refilling 30 days patient needed a follow up visit  Next appt: Visit date not found    Last OARRS:        No data to display

## 2024-07-04 DIAGNOSIS — F41.1 GAD (GENERALIZED ANXIETY DISORDER): ICD-10-CM

## 2024-07-04 DIAGNOSIS — F33.0 MILD EPISODE OF RECURRENT MAJOR DEPRESSIVE DISORDER (HCC): ICD-10-CM

## 2024-07-05 RX ORDER — SERTRALINE HYDROCHLORIDE 100 MG/1
100 TABLET, FILM COATED ORAL DAILY
Qty: 30 TABLET | Refills: 0 | Status: SHIPPED | OUTPATIENT
Start: 2024-07-05

## 2024-07-05 NOTE — TELEPHONE ENCOUNTER
Medication:   Requested Prescriptions     Pending Prescriptions Disp Refills    sertraline (ZOLOFT) 100 MG tablet 30 tablet 0     Sig: Take 1 tablet by mouth daily        Last Filled:  06/03/24    Patient Phone Number: [phone] (home)     Last appt: 4/4/2024 Return in about 6 weeks (around 5/16/2024).   Next appt: Visit date not found    Last OARRS:        No data to display

## 2024-08-11 DIAGNOSIS — F33.0 MILD EPISODE OF RECURRENT MAJOR DEPRESSIVE DISORDER (HCC): ICD-10-CM

## 2024-08-12 RX ORDER — BUPROPION HYDROCHLORIDE 300 MG/1
300 TABLET ORAL EVERY MORNING
Qty: 30 TABLET | Refills: 0 | OUTPATIENT
Start: 2024-08-12

## 2024-08-12 NOTE — TELEPHONE ENCOUNTER
Medication:   Requested Prescriptions     Pending Prescriptions Disp Refills    buPROPion (WELLBUTRIN XL) 300 MG extended release tablet 30 tablet 0     Sig: Take 1 tablet by mouth every morning        Last Filled:  06/03/24    Patient Phone Number: [phone] (home)     Last appt: 4/4/2024   Return in about 6 weeks (around 5/16/2024).  Next appt: Visit date not found    Last OARRS:        No data to display

## 2024-08-19 ENCOUNTER — TELEPHONE (OUTPATIENT)
Dept: PRIMARY CARE CLINIC | Age: 29
End: 2024-08-19

## 2024-08-19 ENCOUNTER — TELEMEDICINE (OUTPATIENT)
Dept: PRIMARY CARE CLINIC | Age: 29
End: 2024-08-19
Payer: COMMERCIAL

## 2024-08-19 DIAGNOSIS — F33.0 MILD EPISODE OF RECURRENT MAJOR DEPRESSIVE DISORDER (HCC): ICD-10-CM

## 2024-08-19 DIAGNOSIS — F41.1 GAD (GENERALIZED ANXIETY DISORDER): ICD-10-CM

## 2024-08-19 PROBLEM — F33.42 RECURRENT MAJOR DEPRESSIVE DISORDER, IN FULL REMISSION (HCC): Status: RESOLVED | Noted: 2017-08-30 | Resolved: 2024-08-19

## 2024-08-19 PROCEDURE — 99214 OFFICE O/P EST MOD 30 MIN: CPT | Performed by: STUDENT IN AN ORGANIZED HEALTH CARE EDUCATION/TRAINING PROGRAM

## 2024-08-19 PROCEDURE — 1036F TOBACCO NON-USER: CPT | Performed by: STUDENT IN AN ORGANIZED HEALTH CARE EDUCATION/TRAINING PROGRAM

## 2024-08-19 PROCEDURE — G8419 CALC BMI OUT NRM PARAM NOF/U: HCPCS | Performed by: STUDENT IN AN ORGANIZED HEALTH CARE EDUCATION/TRAINING PROGRAM

## 2024-08-19 PROCEDURE — G8427 DOCREV CUR MEDS BY ELIG CLIN: HCPCS | Performed by: STUDENT IN AN ORGANIZED HEALTH CARE EDUCATION/TRAINING PROGRAM

## 2024-08-19 RX ORDER — HYDROXYZINE PAMOATE 50 MG/1
50 CAPSULE ORAL 3 TIMES DAILY PRN
Qty: 30 CAPSULE | Refills: 5 | Status: SHIPPED | OUTPATIENT
Start: 2024-08-19

## 2024-08-19 RX ORDER — BUPROPION HYDROCHLORIDE 300 MG/1
300 TABLET ORAL EVERY MORNING
Qty: 90 TABLET | Refills: 3 | Status: SHIPPED | OUTPATIENT
Start: 2024-08-19

## 2024-08-19 RX ORDER — SERTRALINE HYDROCHLORIDE 100 MG/1
100 TABLET, FILM COATED ORAL DAILY
Qty: 90 TABLET | Refills: 3 | Status: SHIPPED | OUTPATIENT
Start: 2024-08-19

## 2024-08-19 RX ORDER — BUPROPION HYDROCHLORIDE 300 MG/1
300 TABLET ORAL EVERY MORNING
Qty: 30 TABLET | Refills: 0 | OUTPATIENT
Start: 2024-08-19

## 2024-08-19 SDOH — ECONOMIC STABILITY: FOOD INSECURITY: WITHIN THE PAST 12 MONTHS, YOU WORRIED THAT YOUR FOOD WOULD RUN OUT BEFORE YOU GOT MONEY TO BUY MORE.: NEVER TRUE

## 2024-08-19 SDOH — ECONOMIC STABILITY: INCOME INSECURITY: HOW HARD IS IT FOR YOU TO PAY FOR THE VERY BASICS LIKE FOOD, HOUSING, MEDICAL CARE, AND HEATING?: NOT HARD AT ALL

## 2024-08-19 SDOH — ECONOMIC STABILITY: FOOD INSECURITY: WITHIN THE PAST 12 MONTHS, THE FOOD YOU BOUGHT JUST DIDN'T LAST AND YOU DIDN'T HAVE MONEY TO GET MORE.: NEVER TRUE

## 2024-08-19 ASSESSMENT — ENCOUNTER SYMPTOMS
VOMITING: 0
NAUSEA: 0

## 2024-08-19 NOTE — TELEPHONE ENCOUNTER
Medication:   Requested Prescriptions     Refused Prescriptions Disp Refills    buPROPion (WELLBUTRIN XL) 300 MG extended release tablet 30 tablet 0     Sig: Take 1 tablet by mouth every morning     Refused By: CHAS MAE     Reason for Refusal: Patient needs an appointment        Last Filled:  patient needs to schedule an office visit.     Patient Phone Number: [phone] (home)     Last appt: 4/4/2024   Next appt: Visit date not found    Last OARRS:        No data to display

## 2024-08-19 NOTE — PROGRESS NOTES
2024       TELEHEALTH EVALUATION -- Audio/Visual (During COVID-19 public health emergency)    HPI:    Ever Glaser (:  1995) has requested an audio/video evaluation for the following concern(s):    Mood Disorder:  Patient presents for follow-up of depression and anxiety disorder. Current complaints include: none.  He denies any other symptoms. Symptoms/signs of tran: none.  External stressors: nothing new. Current treatment includes: Zoloft- 100 and Wellbutrin- 300.  Medication side effects: none.      Review of Systems   Constitutional:  Negative for activity change, fatigue and unexpected weight change.   Gastrointestinal:  Negative for nausea and vomiting.   Endocrine: Negative for cold intolerance and heat intolerance.   Skin:  Negative for rash.   Neurological:  Negative for dizziness, seizures and light-headedness.   Psychiatric/Behavioral:  Negative for agitation, decreased concentration, dysphoric mood, self-injury, sleep disturbance and suicidal ideas. The patient is not nervous/anxious.        Prior to Visit Medications    Medication Sig Taking? Authorizing Provider   buPROPion (WELLBUTRIN XL) 300 MG extended release tablet Take 1 tablet by mouth every morning Yes Damian Sánchez DO   hydrOXYzine pamoate (VISTARIL) 50 MG capsule Take 1 capsule by mouth 3 times daily as needed for Anxiety Yes Damian Sánchez DO   sertraline (ZOLOFT) 100 MG tablet Take 1 tablet by mouth daily Yes Damian Sánchez DO       Social History     Tobacco Use    Smoking status: Never    Smokeless tobacco: Never   Vaping Use    Vaping status: Never Used   Substance Use Topics    Alcohol use: Yes    Drug use: Never        Past Medical History:   Diagnosis Date    History of fracture of forearm        PHYSICAL EXAMINATION:  There were no vitals taken for this visit.  Wt Readings from Last 3 Encounters:   24 89.9 kg (198 lb 3.2 oz)   23 89.8 kg (198 lb)   23 88.3 kg (194 lb 9.6 oz)       [ INSTRUCTIONS:  \"[x]\"